# Patient Record
Sex: MALE | Race: WHITE | NOT HISPANIC OR LATINO | Employment: OTHER | URBAN - METROPOLITAN AREA
[De-identification: names, ages, dates, MRNs, and addresses within clinical notes are randomized per-mention and may not be internally consistent; named-entity substitution may affect disease eponyms.]

---

## 2017-02-02 ENCOUNTER — TELEPHONE (OUTPATIENT)
Dept: CARDIOLOGY | Facility: MEDICAL CENTER | Age: 67
End: 2017-02-02

## 2017-02-02 ENCOUNTER — OFFICE VISIT (OUTPATIENT)
Dept: CARDIOLOGY | Facility: MEDICAL CENTER | Age: 67
End: 2017-02-02
Payer: COMMERCIAL

## 2017-02-02 VITALS
HEIGHT: 68 IN | BODY MASS INDEX: 32.43 KG/M2 | DIASTOLIC BLOOD PRESSURE: 62 MMHG | SYSTOLIC BLOOD PRESSURE: 110 MMHG | HEART RATE: 78 BPM | WEIGHT: 214 LBS | OXYGEN SATURATION: 95 %

## 2017-02-02 DIAGNOSIS — R06.02 SOB (SHORTNESS OF BREATH): ICD-10-CM

## 2017-02-02 DIAGNOSIS — E83.42 HYPOMAGNESEMIA: ICD-10-CM

## 2017-02-02 DIAGNOSIS — E11.9 TYPE 2 DIABETES MELLITUS WITHOUT COMPLICATION, WITHOUT LONG-TERM CURRENT USE OF INSULIN (HCC): ICD-10-CM

## 2017-02-02 DIAGNOSIS — E11.8 TYPE 2 DIABETES MELLITUS WITH COMPLICATION, WITH LONG-TERM CURRENT USE OF INSULIN (HCC): ICD-10-CM

## 2017-02-02 DIAGNOSIS — I46.9 CARDIAC ARREST (HCC): ICD-10-CM

## 2017-02-02 DIAGNOSIS — E78.2 MIXED HYPERLIPIDEMIA: ICD-10-CM

## 2017-02-02 DIAGNOSIS — Z79.4 TYPE 2 DIABETES MELLITUS WITH COMPLICATION, WITH LONG-TERM CURRENT USE OF INSULIN (HCC): ICD-10-CM

## 2017-02-02 PROCEDURE — 99214 OFFICE O/P EST MOD 30 MIN: CPT | Performed by: INTERNAL MEDICINE

## 2017-02-02 ASSESSMENT — ENCOUNTER SYMPTOMS
RESPIRATORY NEGATIVE: 1
EYES NEGATIVE: 1
CARDIOVASCULAR NEGATIVE: 1
ORTHOPNEA: 0
SPUTUM PRODUCTION: 0
CLAUDICATION: 0
FEVER: 0
WEAKNESS: 0
MUSCULOSKELETAL NEGATIVE: 1
HEMOPTYSIS: 0
LOSS OF CONSCIOUSNESS: 0
WHEEZING: 0
SORE THROAT: 0
STRIDOR: 0
BRUISES/BLEEDS EASILY: 0
PND: 0
NEUROLOGICAL NEGATIVE: 1
DIZZINESS: 0
CONSTITUTIONAL NEGATIVE: 1
CHILLS: 0
SHORTNESS OF BREATH: 0
COUGH: 0
PALPITATIONS: 0
GASTROINTESTINAL NEGATIVE: 1

## 2017-02-02 NOTE — Clinical Note
[facility]   [address], [facility] [address] [facility] [address]  [place] [address]  Phone: [phone]  Fax: [phone]              Mark Lopez  1950    Encounter Date: 2/2/2017    Paul Baldwin M.D.          PROGRESS NOTE:  Subjective:   Mark Lopez is a 66 y.o. male who presents today for his lower extremity edema mild pulmonary protection. Right heart catheterization did not suggest pulmonary arterial hypertension. He's been responding to Lasix. He had a sleep study completed which showed 100 apnic events per hour. He is feeling significantly improved and less fatigue.    Past Medical History   Diagnosis Date   • Hyperlipidemia 4/23/2012   • Renal disorder      kidney stones   • Renal failure    • Heart failure (CMS-HCC)    • Hypertension    • CHF (congestive heart failure) (CMS-HCC) 8//12/16     diastolic   • Arthritis      all over   • Cancer (CMS-HCC) 2010     thyroid cancer   • DM (diabetes mellitus) (CMS-HCC) 4/23/2012     oral meds only   • High cholesterol    • Indigestion    • Dental disorder      upper denture   • Breath shortness      r/t chf   • Snoring    • Disorder of thyroid      had surgery, on meds   • Cataract      shandra eyes, no surgery yet   • CHEST PAIN 4/23/2012   • Heart burn    • Pain      compression fracture of L1     Past Surgical History   Procedure Laterality Date   • Other abdominal surgery       gallbladder   • Other       Thyroid   • Other       Ear tumor   • Exploratory laparotomy N/A 9/5/2015     Procedure: EXPLORATORY LAPAROTOMY;  Surgeon: Mark Peters M.D.;  Location: [facility];  Service:    • Exploratory laparotomy  9/7/2015     Procedure: EXPLORATORY LAPAROTOMY;  Surgeon: Mark Peters M.D.;  Location: [facility];  Service:    • Incision and drainage general  9/12/2015     Procedure: INCISION AND DRAINAGE GENERAL ABDOMINAL WASHOUT AND CLOSURE;  Surgeon: Mark Peters M.D.;   Location: SURGERY [facility];  Service:    • Other orthopedic surgery       right shoulder x 2   • Bunionectomy Left      Family History   Problem Relation Age of Onset   • Heart Attack Father    • Heart Disease Father    • Heart Disease Brother    • Heart Attack Brother      History   Smoking status   • Never Smoker    Smokeless tobacco   • Never Used     Allergies   Allergen Reactions   • Codeine Vomiting and Nausea     Outpatient Encounter Prescriptions as of 2/2/2017   Medication Sig Dispense Refill   • furosemide (LASIX) 20 MG Tab Take 1 Tab by mouth every morning. Take additional tablet as needed for weight gain of 2-3 lbs overnight or 5 lbs in a week. 60 Tab 11   • atorvastatin (LIPITOR) 80 MG tablet Take 0.5 Tabs by mouth every evening. 30 Tab 11   • glimepiride (AMARYL) 2 MG Tab Take 2 Tabs by mouth every morning. 60 Tab 3   • aspirin (ASA) 81 MG Chew Tab chewable tablet Take 1 Tab by mouth every day. 100 Tab 11   • omeprazole (PRILOSEC) 20 MG delayed-release capsule Take 40 mg by mouth every day. Indications: Gastroesophageal Reflux Disease     • pregabalin (LYRICA) 150 MG Cap Take 300 mg by mouth 2 times a day.     • Multiple Vitamins-Minerals (CENTRUM) Tab Take 1 Tab by mouth every day.     • levothyroxine (SYNTHROID) 175 MCG Tab Take 175 mcg by mouth Every morning on an empty stomach.     • tamsulosin (FLOMAX) 0.4 MG capsule Take 0.8 mg by mouth every evening.     • methylPREDNISolone (MEDROL) 4 MG Tab Take 1 Tab by mouth every day. (Patient not taking: Reported on 10/14/2016) 30 Tab 0     No facility-administered encounter medications on file as of 2/2/2017.     Review of Systems   Constitutional: Negative.  Negative for fever, chills and malaise/fatigue.   HENT: Negative.  Negative for sore throat.    Eyes: Negative.    Respiratory: Negative.  Negative for cough, hemoptysis, sputum production, shortness of breath, wheezing and stridor.    Cardiovascular: Negative.  Negative for chest pain,  "palpitations, orthopnea, claudication, leg swelling and PND.   Gastrointestinal: Negative.    Genitourinary: Negative.    Musculoskeletal: Negative.    Skin: Negative.    Neurological: Negative.  Negative for dizziness, loss of consciousness and weakness.   Endo/Heme/Allergies: Negative.  Does not bruise/bleed easily.   All other systems reviewed and are negative.       Objective:   /62 mmHg  Pulse 78  Ht 1.727 m (5' 8\")  Wt 97.07 kg (214 lb)  BMI 32.55 kg/m2  SpO2 95%    Physical Exam   Constitutional: He is oriented to person, place, and time. He appears well-developed and well-nourished. No distress.   HENT:   Head: Normocephalic.   Mouth/Throat: Oropharynx is clear and moist.   Eyes: EOM are normal. Pupils are equal, round, and reactive to light. Right eye exhibits no discharge. Left eye exhibits no discharge. No scleral icterus.   Neck: Normal range of motion. Neck supple. No JVD present. No tracheal deviation present.   Cardiovascular: Normal rate, regular rhythm, S1 normal, S2 normal, normal heart sounds, intact distal pulses and normal pulses.  Exam reveals no gallop, no S3, no S4 and no friction rub.    No murmur heard.   No systolic murmur is present    No diastolic murmur is present   Pulses:       Carotid pulses are 2+ on the right side, and 2+ on the left side.       Radial pulses are 2+ on the right side, and 2+ on the left side.        Dorsalis pedis pulses are 2+ on the right side, and 2+ on the left side.        Posterior tibial pulses are 2+ on the right side, and 2+ on the left side.   Pulmonary/Chest: Effort normal and breath sounds normal. No respiratory distress. He has no wheezes. He has no rales.   Abdominal: Soft. Bowel sounds are normal. He exhibits no distension and no mass. There is no tenderness. There is no rebound and no guarding.   Musculoskeletal: He exhibits no edema.   Neurological: He is alert and oriented to person, place, and time. No cranial nerve deficit.   Skin: " Skin is warm and dry. He is not diaphoretic. No pallor.   Psychiatric: He has a normal mood and affect. His behavior is normal. Judgment and thought content normal.   Nursing note and vitals reviewed.      Assessment:     1. Type 2 diabetes mellitus without complication, without long-term current use of insulin (HCC)     2. Cardiac arrest (CMS-HCC)     3. Type 2 diabetes mellitus with complication, with long-term current use of insulin (HCC)     4. SOB (shortness of breath)     5. Mixed hyperlipidemia     6. Hypomagnesemia         Medical Decision Making:  Today's Assessment / Status / Plan:     67 y/o M with mild LE edema and CARMELITA.  No CHF.  We will follow his response to CARMELITA therapy and likely discharge him from clinic once we get him euvolemic.    Thank for you allowing me to take part in your patient's care, please call should you have any questions or would like to discuss this patient.      Homero Sahu PA-C  [address] [place] [address]  VIA Facsimile: [phone]

## 2017-02-02 NOTE — TELEPHONE ENCOUNTER
Spoke with patient who states for the past week or so his BP has been low and pulse elevated.  1/2880/60, 1/29 85/64, 1/31 74/58, 2/1 99/70 with pulse ranging .  He is not taking his furosemide unless he has weight gain which he has not with weight stable at about 208 #.  He is feeling ok except at night with C-Pap gets some SOB with change of position.  He is able to shovel snow and does not get short of breath.  Scheduled him today with Dr. Baldwin @ 1:30pm.  Marie Mckinney in authorizations notified.

## 2017-02-02 NOTE — TELEPHONE ENCOUNTER
----- Message from Florence Feng sent at 2/2/2017  8:24 AM PST -----  Regarding: Problems with blood pressure and heart rate  MILES/Lily    Patient said that yesterday, 2/1, his blood pressure was 74/58 and his heart rate was 103. He wants a call back at [phone] to discuss and find out what he should do.

## 2017-02-02 NOTE — MR AVS SNAPSHOT
"        Mark Lopez   2017 1:30 PM   Office Visit   MRN: [id number]    Department:  [facility]   Dept Phone:  [phone]    Description:  Male : 1950   Provider:  Paul Baldwin M.D.           Reason for Visit     Follow-Up HF       Allergies as of 2017     Allergen Noted Reactions    Codeine 2015   Vomiting, Nausea      You were diagnosed with     Type 2 diabetes mellitus without complication, without long-term current use of insulin (CMS-HCC)   [5881717]       Cardiac arrest (CMS-HCC)   [427.5.ICD-9-CM]       Type 2 diabetes mellitus with complication, with long-term current use of insulin (CMS-HCC)   [0611317]       SOB (shortness of breath)   [469261]       Mixed hyperlipidemia   [272.2.ICD-9-CM]       Hypomagnesemia   [653305]         Vital Signs     Blood Pressure Pulse Height Weight Body Mass Index Oxygen Saturation    110/62 mmHg 78 1.727 m (5' 8\") 97.07 kg (214 lb) 32.55 kg/m2 95%    Smoking Status                   Never Smoker            Basic Information     Date Of Birth Sex Race Ethnicity Preferred Language    1950 Male White Non- English      Your appointments     Mar 02, 2017  9:15 AM   Heart Failure Established with Paul Baldwin M.D.   [facility] (--)    [address] [facility] [address]  [place] [facility] [facility] [address]   [phone]              Problem List              ICD-10-CM Priority Class Noted - Resolved    CHEST PAIN    2012 - Present    DM (diabetes mellitus) (CMS-HCC) E11.9   2012 - Present    DM2 (diabetes mellitus, type 2) (CMS-HCC) E11.9 Low  2015 - Present    Leukocytosis D72.829 Medium  2015 - Present    ARF (acute renal failure) (CMS-HCC) N17.9 High  2015 - Present    Intra-abdominal hematoma S36.92XA High  2015 - Present    Cardiac arrest (CMS-HCC) I46.9 Low  2015 - Present    Respiratory insufficiency following shock, trauma, or surgery MEG1884 Medium  2015 - " Present    Inadequate anticoagulation Z51.81, Z79.01 Medium  9/9/2015 - Present    BPH (benign prostatic hypertrophy) N40.0 Medium  9/15/2015 - Present    Hypomagnesemia E83.42 Medium  9/21/2015 - Present    Left-sided weakness M62.81   12/23/2015 - Present    Acute diastolic heart failure (CMS-HCC) I50.31   8/11/2016 - Present    Hypokalemia E87.6   8/11/2016 - Present    Essential hypertension I10   8/18/2016 - Present    Mixed hyperlipidemia E78.2   8/18/2016 - Present    SOB (shortness of breath) R06.02   9/8/2016 - Present      Health Maintenance        Date Due Completion Dates    DIABETES MONOFILAMENT / LE EXAM 1/12/1951 ---    RETINAL SCREENING 7/12/1969 ---    URINE ACR / MICROALBUMIN 7/12/1969 ---    IMM DTaP/Tdap/Td Vaccine (1 - Tdap) 7/12/1970 ---    COLONOSCOPY 7/12/2001 ---    IMM ZOSTER VACCINE 7/12/2011 ---    IMM PNEUMOCOCCAL 65+ (ADULT) LOW/MEDIUM RISK SERIES (1 of 2 - PCV13) 8/31/2016 8/31/2015    IMM INFLUENZA (1) 9/1/2016 9/17/2015    A1C SCREENING 3/6/2017 9/6/2016, 12/23/2015, 8/30/2015    FASTING LIPID PROFILE 9/6/2017 9/6/2016    SERUM CREATININE 9/16/2017 9/16/2016, 9/6/2016, 8/12/2016, 8/11/2016, 8/10/2016, 12/23/2015, 11/30/2015, 9/22/2015, 9/21/2015, 9/18/2015, 9/17/2015, 9/16/2015, 9/15/2015, 9/14/2015, 9/13/2015, 9/12/2015, 9/11/2015, 9/10/2015, 9/9/2015, 9/7/2015, 9/6/2015, 9/5/2015, 9/5/2015, 9/5/2015, 9/5/2015, 9/5/2015, 9/4/2015, 9/3/2015, 9/2/2015, 9/1/2015, 8/31/2015, 8/30/2015, 8/29/2015, 2/9/2005            Current Immunizations     Influenza Vaccine Quad Inj (Preserved) 9/17/2015  1:32 PM    Pneumococcal polysaccharide vaccine (PPSV-23) 8/31/2015  6:48 AM      Below and/or attached are the medications your provider expects you to take. Review all of your home medications and newly ordered medications with your provider and/or pharmacist. Follow medication instructions as directed by your provider and/or pharmacist. Please keep your medication list with you and share with your  provider. Update the information when medications are discontinued, doses are changed, or new medications (including over-the-counter products) are added; and carry medication information at all times in the event of emergency situations     Allergies:  CODEINE - Vomiting,Nausea               Medications  Valid as of: February 02, 2017 -  2:08 PM    Generic Name Brand Name Tablet Size Instructions for use    Aspirin (Chew Tab) ASA 81 MG Take 1 Tab by mouth every day.        Atorvastatin Calcium (Tab) LIPITOR 80 MG Take 0.5 Tabs by mouth every evening.        Furosemide (Tab) LASIX 20 MG Take 1 Tab by mouth every morning. Take additional tablet as needed for weight gain of 2-3 lbs overnight or 5 lbs in a week.        Glimepiride (Tab) AMARYL 2 MG Take 2 Tabs by mouth every morning.        Levothyroxine Sodium (Tab) SYNTHROID 175 MCG Take 175 mcg by mouth Every morning on an empty stomach.        MethylPREDNISolone (Tab) MEDROL 4 MG Take 1 Tab by mouth every day.        Multiple Vitamins-Minerals (Tab) CENTRUM  Take 1 Tab by mouth every day.        Omeprazole (CAPSULE DELAYED RELEASE) PRILOSEC 20 MG Take 40 mg by mouth every day. Indications: Gastroesophageal Reflux Disease        Pregabalin (Cap) LYRICA 150 MG Take 300 mg by mouth 2 times a day.        Tamsulosin HCl (Cap) FLOMAX 0.4 MG Take 0.8 mg by mouth every evening.        .                 Medicines prescribed today were sent to:     [facility] [address] [address]    Phone: [phone] Fax: [phone]    Open 24 Hours?: No      Medication refill instructions:       If your prescription bottle indicates you have medication refills left, it is not necessary to call your provider’s office. Please contact your pharmacy and they will refill your medication.    If your prescription bottle indicates you do not have any refills left, you may request refills at any time through one of the  following ways: The online kooldiner system (except Urgent Care), by calling your provider’s office, or by asking your pharmacy to contact your provider’s office with a refill request. Medication refills are processed only during regular business hours and may not be available until the next business day. Your provider may request additional information or to have a follow-up visit with you prior to refilling your medication.   *Please Note: Medication refills are assigned a new Rx number when refilled electronically. Your pharmacy may indicate that no refills were authorized even though a new prescription for the same medication is available at the pharmacy. Please request the medicine by name with the pharmacy before contacting your provider for a refill.           kooldiner Access Code: Activation code not generated  Current kooldiner Status: Active

## 2017-02-03 ENCOUNTER — TELEPHONE (OUTPATIENT)
Dept: CARDIOLOGY | Facility: MEDICAL CENTER | Age: 67
End: 2017-02-03

## 2017-02-03 RX ORDER — ACETAZOLAMIDE 125 MG/1
125 TABLET ORAL DAILY
COMMUNITY
Start: 2017-02-03 | End: 2022-07-15

## 2017-02-03 RX ORDER — CYANOCOBALAMIN 1000 UG/ML
1000 INJECTION, SOLUTION INTRAMUSCULAR; SUBCUTANEOUS
COMMUNITY
Start: 2017-02-03 | End: 2023-07-14

## 2017-02-03 RX ORDER — SPIRONOLACTONE 25 MG/1
25 TABLET ORAL DAILY
COMMUNITY
Start: 2017-02-03 | End: 2022-04-01

## 2017-02-03 RX ORDER — TESTOSTERONE CYPIONATE 1000 MG/10ML
75 INJECTION, SOLUTION INTRAMUSCULAR
COMMUNITY
Start: 2017-02-03 | End: 2022-11-04

## 2017-02-03 RX ORDER — PIOGLITAZONEHYDROCHLORIDE 30 MG/1
30 TABLET ORAL DAILY
COMMUNITY
Start: 2017-02-03 | End: 2017-02-15 | Stop reason: SDUPTHER

## 2017-02-03 RX ORDER — CABERGOLINE 0.5 MG/1
0.5 TABLET ORAL
COMMUNITY
Start: 2017-02-03 | End: 2019-06-12

## 2017-02-03 NOTE — TELEPHONE ENCOUNTER
----- Message from Jessica Luther sent at 2/2/2017  4:20 PM PST -----  Regarding: Pt calling back with update on his medication's   RO/Lily,    Patient calling back with information in regards to what medications he takes. States there was some confusion during his visit today, he can be reached at [phone] for a call back

## 2017-02-03 NOTE — PROGRESS NOTES
Reevaluation of 6MWT/MLWHF Questionnaire    9/8/2016:  6MWT: 296 meters  MLWHF: 7    2/2/2017:  6MWT: 298.7 meters  MLWHF: See TAMIA Whitaker RN  [phone]

## 2017-02-03 NOTE — TELEPHONE ENCOUNTER
Called patient and updated medication list.  He will make a new list and bring it with him when he comes to his next appointment.

## 2017-02-03 NOTE — PROGRESS NOTES
Subjective:   Mark Lopez is a 66 y.o. male who presents today for his lower extremity edema mild pulmonary protection. Right heart catheterization did not suggest pulmonary arterial hypertension. He's been responding to Lasix. He had a sleep study completed which showed 100 apnic events per hour. He is feeling significantly improved and less fatigue.    Past Medical History   Diagnosis Date   • Hyperlipidemia 4/23/2012   • Renal disorder      kidney stones   • Renal failure    • Heart failure (CMS-HCC)    • Hypertension    • CHF (congestive heart failure) (CMS-HCC) 8//12/16     diastolic   • Arthritis      all over   • Cancer (CMS-HCC) 2010     thyroid cancer   • DM (diabetes mellitus) (CMS-HCC) 4/23/2012     oral meds only   • High cholesterol    • Indigestion    • Dental disorder      upper denture   • Breath shortness      r/t chf   • Snoring    • Disorder of thyroid      had surgery, on meds   • Cataract      shandra eyes, no surgery yet   • CHEST PAIN 4/23/2012   • Heart burn    • Pain      compression fracture of L1     Past Surgical History   Procedure Laterality Date   • Other abdominal surgery       gallbladder   • Other       Thyroid   • Other       Ear tumor   • Exploratory laparotomy N/A 9/5/2015     Procedure: EXPLORATORY LAPAROTOMY;  Surgeon: Mark Peters M.D.;  Location: SURGERY [facility];  Service:    • Exploratory laparotomy  9/7/2015     Procedure: EXPLORATORY LAPAROTOMY;  Surgeon: Mark Peters M.D.;  Location: SURGERY [facility];  Service:    • Incision and drainage general  9/12/2015     Procedure: INCISION AND DRAINAGE GENERAL ABDOMINAL WASHOUT AND CLOSURE;  Surgeon: Mark Peters M.D.;  Location: SURGERY [facility];  Service:    • Other orthopedic surgery       right shoulder x 2   • Bunionectomy Left      Family History   Problem Relation Age of Onset   • Heart Attack Father    • Heart Disease Father    • Heart Disease Brother    • Heart Attack  Brother      History   Smoking status   • Never Smoker    Smokeless tobacco   • Never Used     Allergies   Allergen Reactions   • Codeine Vomiting and Nausea     Outpatient Encounter Prescriptions as of 2/2/2017   Medication Sig Dispense Refill   • furosemide (LASIX) 20 MG Tab Take 1 Tab by mouth every morning. Take additional tablet as needed for weight gain of 2-3 lbs overnight or 5 lbs in a week. 60 Tab 11   • atorvastatin (LIPITOR) 80 MG tablet Take 0.5 Tabs by mouth every evening. 30 Tab 11   • glimepiride (AMARYL) 2 MG Tab Take 2 Tabs by mouth every morning. 60 Tab 3   • aspirin (ASA) 81 MG Chew Tab chewable tablet Take 1 Tab by mouth every day. 100 Tab 11   • omeprazole (PRILOSEC) 20 MG delayed-release capsule Take 40 mg by mouth every day. Indications: Gastroesophageal Reflux Disease     • pregabalin (LYRICA) 150 MG Cap Take 300 mg by mouth 2 times a day.     • Multiple Vitamins-Minerals (CENTRUM) Tab Take 1 Tab by mouth every day.     • levothyroxine (SYNTHROID) 175 MCG Tab Take 175 mcg by mouth Every morning on an empty stomach.     • tamsulosin (FLOMAX) 0.4 MG capsule Take 0.8 mg by mouth every evening.     • methylPREDNISolone (MEDROL) 4 MG Tab Take 1 Tab by mouth every day. (Patient not taking: Reported on 10/14/2016) 30 Tab 0     No facility-administered encounter medications on file as of 2/2/2017.     Review of Systems   Constitutional: Negative.  Negative for fever, chills and malaise/fatigue.   HENT: Negative.  Negative for sore throat.    Eyes: Negative.    Respiratory: Negative.  Negative for cough, hemoptysis, sputum production, shortness of breath, wheezing and stridor.    Cardiovascular: Negative.  Negative for chest pain, palpitations, orthopnea, claudication, leg swelling and PND.   Gastrointestinal: Negative.    Genitourinary: Negative.    Musculoskeletal: Negative.    Skin: Negative.    Neurological: Negative.  Negative for dizziness, loss of consciousness and weakness.  "  Endo/Heme/Allergies: Negative.  Does not bruise/bleed easily.   All other systems reviewed and are negative.       Objective:   /62 mmHg  Pulse 78  Ht 1.727 m (5' 8\")  Wt 97.07 kg (214 lb)  BMI 32.55 kg/m2  SpO2 95%    Physical Exam   Constitutional: He is oriented to person, place, and time. He appears well-developed and well-nourished. No distress.   HENT:   Head: Normocephalic.   Mouth/Throat: Oropharynx is clear and moist.   Eyes: EOM are normal. Pupils are equal, round, and reactive to light. Right eye exhibits no discharge. Left eye exhibits no discharge. No scleral icterus.   Neck: Normal range of motion. Neck supple. No JVD present. No tracheal deviation present.   Cardiovascular: Normal rate, regular rhythm, S1 normal, S2 normal, normal heart sounds, intact distal pulses and normal pulses.  Exam reveals no gallop, no S3, no S4 and no friction rub.    No murmur heard.   No systolic murmur is present    No diastolic murmur is present   Pulses:       Carotid pulses are 2+ on the right side, and 2+ on the left side.       Radial pulses are 2+ on the right side, and 2+ on the left side.        Dorsalis pedis pulses are 2+ on the right side, and 2+ on the left side.        Posterior tibial pulses are 2+ on the right side, and 2+ on the left side.   Pulmonary/Chest: Effort normal and breath sounds normal. No respiratory distress. He has no wheezes. He has no rales.   Abdominal: Soft. Bowel sounds are normal. He exhibits no distension and no mass. There is no tenderness. There is no rebound and no guarding.   Musculoskeletal: He exhibits no edema.   Neurological: He is alert and oriented to person, place, and time. No cranial nerve deficit.   Skin: Skin is warm and dry. He is not diaphoretic. No pallor.   Psychiatric: He has a normal mood and affect. His behavior is normal. Judgment and thought content normal.   Nursing note and vitals reviewed.      Assessment:     1. Type 2 diabetes mellitus without " complication, without long-term current use of insulin (HCC)     2. Cardiac arrest (CMS-HCC)     3. Type 2 diabetes mellitus with complication, with long-term current use of insulin (HCC)     4. SOB (shortness of breath)     5. Mixed hyperlipidemia     6. Hypomagnesemia         Medical Decision Making:  Today's Assessment / Status / Plan:     67 y/o M with mild LE edema and CARMELITA.  No CHF.  We will follow his response to CARMELITA therapy and likely discharge him from clinic once we get him euvolemic.    Thank for you allowing me to take part in your patient's care, please call should you have any questions or would like to discuss this patient.

## 2017-02-07 ENCOUNTER — TELEPHONE (OUTPATIENT)
Dept: CARDIOLOGY | Facility: MEDICAL CENTER | Age: 67
End: 2017-02-07

## 2017-02-08 NOTE — TELEPHONE ENCOUNTER
----- Message from Kaitlin Lofton sent at 2/7/2017  4:10 PM PST -----  Regarding: med adjustments, low b/p & high heart rate  Contact: [phone]  MILES/doris  Pt calling and the adjustment RO was going to make due to low b/p and high heart rate.  Pt has been expecting a call back since last week, he needs immediate advice, said this cannot wait until next appt which is not until July 24th.  Please call Mark at [phone]

## 2017-02-15 ENCOUNTER — TELEPHONE (OUTPATIENT)
Dept: CARDIOLOGY | Facility: MEDICAL CENTER | Age: 67
End: 2017-02-15

## 2017-02-15 DIAGNOSIS — I50.30 CHF WITH LEFT VENTRICULAR DIASTOLIC DYSFUNCTION, NYHA CLASS 2 (HCC): ICD-10-CM

## 2017-02-15 RX ORDER — FUROSEMIDE 20 MG/1
20 TABLET ORAL PRN
COMMUNITY
Start: 2017-02-15 | End: 2019-06-12

## 2017-02-15 RX ORDER — PIOGLITAZONEHYDROCHLORIDE 30 MG/1
30 TABLET ORAL DAILY
COMMUNITY
Start: 2017-02-15

## 2017-02-15 NOTE — TELEPHONE ENCOUNTER
Pt notified and will take his BP machine to PCP office to have checked.  BP in office was 110/62 pulse 78.

## 2017-02-15 NOTE — TELEPHONE ENCOUNTER
Pt's medication list has been updated.  He is still having low BP and rapid HR he reports. BP ranging from 75/61 to 108/95, systolic mostly < 100.  HR 90 - 112.  He is not particularly symptomatic.  He is using his furosemide only with weight gain 2-3 #.    To Dr. Baldwin to advise regarding any medication changes as suggested to him at his appointment.

## 2017-04-18 ENCOUNTER — OFFICE VISIT (OUTPATIENT)
Dept: CARDIOLOGY | Facility: MEDICAL CENTER | Age: 67
End: 2017-04-18
Payer: COMMERCIAL

## 2017-04-18 VITALS
HEART RATE: 74 BPM | DIASTOLIC BLOOD PRESSURE: 62 MMHG | WEIGHT: 212 LBS | SYSTOLIC BLOOD PRESSURE: 110 MMHG | HEIGHT: 68 IN | BODY MASS INDEX: 32.13 KG/M2 | OXYGEN SATURATION: 97 %

## 2017-04-18 DIAGNOSIS — I46.9 CARDIAC ARREST (HCC): ICD-10-CM

## 2017-04-18 DIAGNOSIS — E78.2 MIXED HYPERLIPIDEMIA: ICD-10-CM

## 2017-04-18 DIAGNOSIS — I10 ESSENTIAL HYPERTENSION: ICD-10-CM

## 2017-04-18 DIAGNOSIS — I50.31 ACUTE DIASTOLIC HEART FAILURE (HCC): ICD-10-CM

## 2017-04-18 DIAGNOSIS — E11.8 TYPE 2 DIABETES MELLITUS WITH COMPLICATION, WITH LONG-TERM CURRENT USE OF INSULIN (HCC): ICD-10-CM

## 2017-04-18 DIAGNOSIS — R06.02 SOB (SHORTNESS OF BREATH): ICD-10-CM

## 2017-04-18 DIAGNOSIS — N17.1 ACUTE RENAL FAILURE WITH ACUTE CORTICAL NECROSIS (HCC): ICD-10-CM

## 2017-04-18 DIAGNOSIS — Z79.4 TYPE 2 DIABETES MELLITUS WITH COMPLICATION, WITH LONG-TERM CURRENT USE OF INSULIN (HCC): ICD-10-CM

## 2017-04-18 DIAGNOSIS — R53.1 LEFT-SIDED WEAKNESS: ICD-10-CM

## 2017-04-18 PROCEDURE — 99214 OFFICE O/P EST MOD 30 MIN: CPT | Performed by: INTERNAL MEDICINE

## 2017-04-18 RX ORDER — OMEPRAZOLE 40 MG/1
40 CAPSULE, DELAYED RELEASE ORAL DAILY
Refills: 0 | COMMUNITY
Start: 2017-04-16 | End: 2022-11-04

## 2017-04-18 RX ORDER — AMOXICILLIN 500 MG/1
CAPSULE ORAL
Refills: 0 | COMMUNITY
Start: 2017-01-27 | End: 2017-08-11

## 2017-04-18 RX ORDER — BLOOD-GLUCOSE METER
KIT MISCELLANEOUS
Refills: 0 | COMMUNITY
Start: 2017-01-19 | End: 2023-07-14

## 2017-04-18 ASSESSMENT — ENCOUNTER SYMPTOMS
FEVER: 0
SHORTNESS OF BREATH: 0
CARDIOVASCULAR NEGATIVE: 1
CONSTITUTIONAL NEGATIVE: 1
PALPITATIONS: 0
WEAKNESS: 0
STRIDOR: 0
SORE THROAT: 0
HEMOPTYSIS: 0
GASTROINTESTINAL NEGATIVE: 1
DIZZINESS: 0
COUGH: 0
ORTHOPNEA: 0
PND: 0
CLAUDICATION: 0
LOSS OF CONSCIOUSNESS: 0
SPUTUM PRODUCTION: 0
RESPIRATORY NEGATIVE: 1
CHILLS: 0
EYES NEGATIVE: 1
NEUROLOGICAL NEGATIVE: 1
BRUISES/BLEEDS EASILY: 0
WHEEZING: 0
MUSCULOSKELETAL NEGATIVE: 1

## 2017-04-18 NOTE — PROGRESS NOTES
Subjective:   Mark Lopez is a 66 y.o. male who presents today as follow-up for his lower extremity swelling edema and shortness of breath. Since starting CPAP and getting his blood pressure control is essentially feeling normal. He has good functional capacity. He recently retired in December and today his wife is retiring. There's been in the summer driving around [place] and spent some time with family. His blood pressures ranging about 100 systolic. His weights been stable about 207-210.    Past Medical History   Diagnosis Date   • Hyperlipidemia 4/23/2012   • Renal disorder      kidney stones   • Renal failure    • Heart failure (CMS-HCC)    • Hypertension    • CHF (congestive heart failure) (CMS-HCC) 8//12/16     diastolic   • Arthritis      all over   • Cancer (CMS-HCC) 2010     thyroid cancer   • DM (diabetes mellitus) (CMS-HCC) 4/23/2012     oral meds only   • High cholesterol    • Indigestion    • Dental disorder      upper denture   • Breath shortness      r/t chf   • Snoring    • Disorder of thyroid      had surgery, on meds   • Cataract      shandra eyes, no surgery yet   • CHEST PAIN 4/23/2012   • Heart burn    • Pain      compression fracture of L1     Past Surgical History   Procedure Laterality Date   • Other abdominal surgery       gallbladder   • Other       Thyroid   • Other       Ear tumor   • Exploratory laparotomy N/A 9/5/2015     Procedure: EXPLORATORY LAPAROTOMY;  Surgeon: Mark Peters M.D.;  Location: [facility];  Service:    • Exploratory laparotomy  9/7/2015     Procedure: EXPLORATORY LAPAROTOMY;  Surgeon: Mark Peters M.D.;  Location: [facility];  Service:    • Incision and drainage general  9/12/2015     Procedure: INCISION AND DRAINAGE GENERAL ABDOMINAL WASHOUT AND CLOSURE;  Surgeon: Mark Peters M.D.;  Location: [facility];  Service:    • Other orthopedic surgery       right shoulder x 2   • Bunionectomy Left       Family History   Problem Relation Age of Onset   • Heart Attack Father    • Heart Disease Father    • Heart Disease Brother    • Heart Attack Brother      History   Smoking status   • Never Smoker    Smokeless tobacco   • Never Used     Allergies   Allergen Reactions   • Codeine Vomiting and Nausea     Outpatient Encounter Prescriptions as of 4/18/2017   Medication Sig Dispense Refill   • FREESTYLE LITE strip   0   • omeprazole (PRILOSEC) 40 MG delayed-release capsule   0   • pioglitazone (ACTOS) 30 MG Tab Take 1 Tab by mouth every day.     • furosemide (LASIX) 20 MG Tab Take 1 Tab by mouth as needed. Take additional tablet as needed for weight gain of 2-3 lbs overnight or 5 lbs in a week.     • acetaZOLAMIDE (DIAMOX) 125 MG Tab Take 1 Tab by mouth 3 times a day.     • spironolactone (ALDACTONE) 25 MG Tab Take 1 Tab by mouth every day.     • cabergoline (DOSTINEX) 0.5 MG tablet Take 1 Tab by mouth 2X A WEEK.     • Testosterone Cypionate 100 MG/ML Solution by Intramuscular route.     • cyanocobalamin (VITAMIN B-12) 1000 MCG/ML Solution 1 mL by Intramuscular route every 7 days.     • aspirin (ASA) 81 MG Chew Tab chewable tablet Take 1 Tab by mouth every day. 100 Tab 11   • pregabalin (LYRICA) 150 MG Cap Take 300 mg by mouth 2 times a day.     • levothyroxine (SYNTHROID) 175 MCG Tab Take 175 mcg by mouth Every morning on an empty stomach.     • tamsulosin (FLOMAX) 0.4 MG capsule Take 0.8 mg by mouth every evening.     • amoxicillin (AMOXIL) 500 MG Cap take 1 capsule by mouth three times a day until finished  0   • STOOL SOFTENER 250 MG capsule   1   • atorvastatin (LIPITOR) 80 MG tablet Take 0.5 Tabs by mouth every evening. 30 Tab 11   • glimepiride (AMARYL) 2 MG Tab Take 2 Tabs by mouth every morning. 60 Tab 3   • omeprazole (PRILOSEC) 20 MG delayed-release capsule Take 40 mg by mouth every day. Indications: Gastroesophageal Reflux Disease       No facility-administered encounter medications on file as of  "4/18/2017.     Review of Systems   Constitutional: Negative.  Negative for fever, chills and malaise/fatigue.   HENT: Negative.  Negative for sore throat.    Eyes: Negative.    Respiratory: Negative.  Negative for cough, hemoptysis, sputum production, shortness of breath, wheezing and stridor.    Cardiovascular: Negative.  Negative for chest pain, palpitations, orthopnea, claudication, leg swelling and PND.   Gastrointestinal: Negative.    Genitourinary: Negative.    Musculoskeletal: Negative.    Skin: Negative.    Neurological: Negative.  Negative for dizziness, loss of consciousness and weakness.   Endo/Heme/Allergies: Negative.  Does not bruise/bleed easily.   All other systems reviewed and are negative.       Objective:   /62 mmHg  Pulse 74  Ht 1.727 m (5' 8\")  Wt 96.163 kg (212 lb)  BMI 32.24 kg/m2  SpO2 97%    Physical Exam   Constitutional: He is oriented to person, place, and time. He appears well-developed and well-nourished. No distress.   HENT:   Head: Normocephalic.   Mouth/Throat: Oropharynx is clear and moist.   Eyes: EOM are normal. Pupils are equal, round, and reactive to light. Right eye exhibits no discharge. Left eye exhibits no discharge. No scleral icterus.   Neck: Normal range of motion. Neck supple. No JVD present. No tracheal deviation present.   Cardiovascular: Normal rate, regular rhythm, S1 normal, S2 normal, normal heart sounds, intact distal pulses and normal pulses.  Exam reveals no gallop, no S3, no S4 and no friction rub.    No murmur heard.   No systolic murmur is present    No diastolic murmur is present   Pulses:       Carotid pulses are 2+ on the right side, and 2+ on the left side.       Radial pulses are 2+ on the right side, and 2+ on the left side.        Dorsalis pedis pulses are 2+ on the right side, and 2+ on the left side.        Posterior tibial pulses are 2+ on the right side, and 2+ on the left side.   Pulmonary/Chest: Effort normal and breath sounds normal. " No respiratory distress. He has no wheezes. He has no rales.   Abdominal: Soft. Bowel sounds are normal. He exhibits no distension and no mass. There is no tenderness. There is no rebound and no guarding.   Musculoskeletal: He exhibits no edema.   Neurological: He is alert and oriented to person, place, and time. No cranial nerve deficit.   Skin: Skin is warm and dry. He is not diaphoretic. No pallor.   Psychiatric: He has a normal mood and affect. His behavior is normal. Judgment and thought content normal.   Nursing note and vitals reviewed.      Assessment:     1. Acute diastolic heart failure (CMS-HCC)     2. Acute renal failure with acute cortical necrosis (CMS-HCC)     3. Cardiac arrest (CMS-HCC)     4. Type 2 diabetes mellitus with complication, with long-term current use of insulin ([facility])     5. Essential hypertension     6. Left-sided weakness     7. Mixed hyperlipidemia     8. SOB (shortness of breath)         Medical Decision Making:  Today's Assessment / Status / Plan:     66-year-old male with exertional shortness of breath and normal coronary arteries with mildly elevated PA pressure now on CPAP. His risk factors are well controlled. I will not make any changes to his medications today as he is doing extraordinarily well. I will simply see him back in 6 months.    1. SOB    - cont spironolactone 25    - lasix prn    - diamox 125 mg daily    2. T2DM, no obstructive CAD during cath    - cont atorva 80      Thank for you allowing me to take part in your patient's care, please call should you have any questions or would like to discuss this patient.

## 2017-04-18 NOTE — Clinical Note
[facility]   [address] [place] [address] [address]  [place] [address]  Phone: [phone]  Fax: [phone]              Mark Lopez  1950    Encounter Date: 4/18/2017    Paul Baldwin M.D.          PROGRESS NOTE:  Subjective:   Mark Lopez is a 66 y.o. male who presents today as follow-up for his lower extremity swelling edema and shortness of breath. Since starting CPAP and getting his blood pressure control is essentially feeling normal. He has good functional capacity. He recently retired in December and today his wife is retiring. There's been in the [place] and spent some time with family. His blood pressures ranging about 100 systolic. His weights been stable about 207-210.    Past Medical History   Diagnosis Date   • Hyperlipidemia 4/23/2012   • Renal disorder      kidney stones   • Renal failure    • Heart failure (CMS-HCC)    • Hypertension    • CHF (congestive heart failure) (CMS-HCC) 8//12/16     diastolic   • Arthritis      all over   • Cancer (CMS-HCC) 2010     thyroid cancer   • DM (diabetes mellitus) (CMS-HCC) 4/23/2012     oral meds only   • High cholesterol    • Indigestion    • Dental disorder      upper denture   • Breath shortness      r/t chf   • Snoring    • Disorder of thyroid      had surgery, on meds   • Cataract      shandra eyes, no surgery yet   • CHEST PAIN 4/23/2012   • Heart burn    • Pain      compression fracture of L1     Past Surgical History   Procedure Laterality Date   • Other abdominal surgery       gallbladder   • Other       Thyroid   • Other       Ear tumor   • Exploratory laparotomy N/A 9/5/2015     Procedure: EXPLORATORY LAPAROTOMY;  Surgeon: Mark Peters M.D.;  Location: [facility];  Service:    • Exploratory laparotomy  9/7/2015     Procedure: EXPLORATORY LAPAROTOMY;  Surgeon: Mark Peters M.D.;  Location: SURGERY [facility];  Service:    • Incision  and drainage general  9/12/2015     Procedure: INCISION AND DRAINAGE GENERAL ABDOMINAL WASHOUT AND CLOSURE;  Surgeon: Mark Peters M.D.;  Location: SURGERY [facility];  Service:    • Other orthopedic surgery       right shoulder x 2   • Bunionectomy Left      Family History   Problem Relation Age of Onset   • Heart Attack Father    • Heart Disease Father    • Heart Disease Brother    • Heart Attack Brother      History   Smoking status   • Never Smoker    Smokeless tobacco   • Never Used     Allergies   Allergen Reactions   • Codeine Vomiting and Nausea     Outpatient Encounter Prescriptions as of 4/18/2017   Medication Sig Dispense Refill   • FREESTYLE LITE strip   0   • omeprazole (PRILOSEC) 40 MG delayed-release capsule   0   • pioglitazone (ACTOS) 30 MG Tab Take 1 Tab by mouth every day.     • furosemide (LASIX) 20 MG Tab Take 1 Tab by mouth as needed. Take additional tablet as needed for weight gain of 2-3 lbs overnight or 5 lbs in a week.     • acetaZOLAMIDE (DIAMOX) 125 MG Tab Take 1 Tab by mouth 3 times a day.     • spironolactone (ALDACTONE) 25 MG Tab Take 1 Tab by mouth every day.     • cabergoline (DOSTINEX) 0.5 MG tablet Take 1 Tab by mouth 2X A WEEK.     • Testosterone Cypionate 100 MG/ML Solution by Intramuscular route.     • cyanocobalamin (VITAMIN B-12) 1000 MCG/ML Solution 1 mL by Intramuscular route every 7 days.     • aspirin (ASA) 81 MG Chew Tab chewable tablet Take 1 Tab by mouth every day. 100 Tab 11   • pregabalin (LYRICA) 150 MG Cap Take 300 mg by mouth 2 times a day.     • levothyroxine (SYNTHROID) 175 MCG Tab Take 175 mcg by mouth Every morning on an empty stomach.     • tamsulosin (FLOMAX) 0.4 MG capsule Take 0.8 mg by mouth every evening.     • amoxicillin (AMOXIL) 500 MG Cap take 1 capsule by mouth three times a day until finished  0   • STOOL SOFTENER 250 MG capsule   1   • atorvastatin (LIPITOR) 80 MG tablet Take 0.5 Tabs by mouth every evening. 30 Tab 11   • glimepiride  "(AMARYL) 2 MG Tab Take 2 Tabs by mouth every morning. 60 Tab 3   • omeprazole (PRILOSEC) 20 MG delayed-release capsule Take 40 mg by mouth every day. Indications: Gastroesophageal Reflux Disease       No facility-administered encounter medications on file as of 4/18/2017.     Review of Systems   Constitutional: Negative.  Negative for fever, chills and malaise/fatigue.   HENT: Negative.  Negative for sore throat.    Eyes: Negative.    Respiratory: Negative.  Negative for cough, hemoptysis, sputum production, shortness of breath, wheezing and stridor.    Cardiovascular: Negative.  Negative for chest pain, palpitations, orthopnea, claudication, leg swelling and PND.   Gastrointestinal: Negative.    Genitourinary: Negative.    Musculoskeletal: Negative.    Skin: Negative.    Neurological: Negative.  Negative for dizziness, loss of consciousness and weakness.   Endo/Heme/Allergies: Negative.  Does not bruise/bleed easily.   All other systems reviewed and are negative.       Objective:   /62 mmHg  Pulse 74  Ht 1.727 m (5' 8\")  Wt 96.163 kg (212 lb)  BMI 32.24 kg/m2  SpO2 97%    Physical Exam   Constitutional: He is oriented to person, place, and time. He appears well-developed and well-nourished. No distress.   HENT:   Head: Normocephalic.   Mouth/Throat: Oropharynx is clear and moist.   Eyes: EOM are normal. Pupils are equal, round, and reactive to light. Right eye exhibits no discharge. Left eye exhibits no discharge. No scleral icterus.   Neck: Normal range of motion. Neck supple. No JVD present. No tracheal deviation present.   Cardiovascular: Normal rate, regular rhythm, S1 normal, S2 normal, normal heart sounds, intact distal pulses and normal pulses.  Exam reveals no gallop, no S3, no S4 and no friction rub.    No murmur heard.   No systolic murmur is present    No diastolic murmur is present   Pulses:       Carotid pulses are 2+ on the right side, and 2+ on the left side.       Radial pulses are 2+ on " the right side, and 2+ on the left side.        Dorsalis pedis pulses are 2+ on the right side, and 2+ on the left side.        Posterior tibial pulses are 2+ on the right side, and 2+ on the left side.   Pulmonary/Chest: Effort normal and breath sounds normal. No respiratory distress. He has no wheezes. He has no rales.   Abdominal: Soft. Bowel sounds are normal. He exhibits no distension and no mass. There is no tenderness. There is no rebound and no guarding.   Musculoskeletal: He exhibits no edema.   Neurological: He is alert and oriented to person, place, and time. No cranial nerve deficit.   Skin: Skin is warm and dry. He is not diaphoretic. No pallor.   Psychiatric: He has a normal mood and affect. His behavior is normal. Judgment and thought content normal.   Nursing note and vitals reviewed.      Assessment:     1. Acute diastolic heart failure (CMS-HCC)     2. Acute renal failure with acute cortical necrosis (CMS-HCC)     3. Cardiac arrest (CMS-HCC)     4. Type 2 diabetes mellitus with complication, with long-term current use of insulin (HCC)     5. Essential hypertension     6. Left-sided weakness     7. Mixed hyperlipidemia     8. SOB (shortness of breath)         Medical Decision Making:  Today's Assessment / Status / Plan:     66-year-old male with exertional shortness of breath and normal coronary arteries with mildly elevated PA pressure now on CPAP. His risk factors are well controlled. I will not make any changes to his medications today as he is doing extraordinarily well. I will simply see him back in 6 months.    1. SOB    - cont spironolactone 25    - lasix prn    - diamox 125 mg daily    2. T2DM, no obstructive CAD during cath    - cont atorva 80      Thank for you allowing me to take part in your patient's care, please call should you have any questions or would like to discuss this patient.      Homero Sahu PA-C  [facility] [place] [address]  VIA Facsimile: [phone]

## 2017-04-18 NOTE — MR AVS SNAPSHOT
"        Mrak Lopez   2017 8:30 AM   Office Visit   MRN: [id number]    Department:  [facility]   Dept Phone:  [phone]    Description:  Male : 1950   Provider:  Paul Baldwin M.D.           Reason for Visit     Follow-Up HF established      Allergies as of 2017     Allergen Noted Reactions    Codeine 2015   Vomiting, Nausea      You were diagnosed with     Acute diastolic heart failure (CMS-HCC)   [428.31.ICD-9-CM]       Acute renal failure with acute cortical necrosis (CMS-[facility])   [629189]       Cardiac arrest (CMS-[facility])   [427.5.ICD-9-CM]       Type 2 diabetes mellitus with complication, with long-term current use of insulin (CMS-[facility])   [7787239]       Essential hypertension   [5177965]       Left-sided weakness   [722911]       Mixed hyperlipidemia   [272.2.ICD-9-CM]       SOB (shortness of breath)   [864193]         Vital Signs     Blood Pressure Pulse Height Weight Body Mass Index Oxygen Saturation    110/62 mmHg 74 1.727 m (5' 8\") 96.163 kg (212 lb) 32.24 kg/m2 97%    Smoking Status                   Never Smoker            Basic Information     Date Of Birth Sex Race Ethnicity Preferred Language    1950 Male White Non- English      Problem List              ICD-10-CM Priority Class Noted - Resolved    CHEST PAIN    2012 - Present    DM (diabetes mellitus) (CMS-[facility]) E11.9   2012 - Present    DM2 (diabetes mellitus, type 2) (CMS-HCC) E11.9 Low  2015 - Present    Leukocytosis D72.829 Medium  2015 - Present    ARF (acute renal failure) (CMS-[facility]) N17.9 High  2015 - Present    Intra-abdominal hematoma S36.92XA High  2015 - Present    Cardiac arrest (CMS-HCC) I46.9 Low  2015 - Present    Respiratory insufficiency following shock, trauma, or surgery [id number] Medium  2015 - Present    Inadequate anticoagulation Z51.81, Z79.01 Medium  2015 - Present    BPH (benign prostatic hypertrophy) N40.0 Medium  9/15/2015 - Present   " Hypomagnesemia E83.42 Medium  9/21/2015 - Present    Left-sided weakness M62.81   12/23/2015 - Present    Acute diastolic heart failure (CMS-HCC) I50.31   8/11/2016 - Present    Hypokalemia E87.6   8/11/2016 - Present    Essential hypertension I10   8/18/2016 - Present    Mixed hyperlipidemia E78.2   8/18/2016 - Present    SOB (shortness of breath) R06.02   9/8/2016 - Present      Health Maintenance        Date Due Completion Dates    DIABETES MONOFILAMENT / LE EXAM 1/12/1951 ---    RETINAL SCREENING 7/12/1969 ---    URINE ACR / MICROALBUMIN 7/12/1969 ---    IMM DTaP/Tdap/Td Vaccine (1 - Tdap) 7/12/1970 ---    COLONOSCOPY 7/12/2001 ---    IMM ZOSTER VACCINE 7/12/2011 ---    IMM PNEUMOCOCCAL 65+ (ADULT) LOW/MEDIUM RISK SERIES (1 of 2 - PCV13) 8/31/2016 8/31/2015    A1C SCREENING 3/6/2017 9/6/2016, 12/23/2015, 8/30/2015    FASTING LIPID PROFILE 9/6/2017 9/6/2016    SERUM CREATININE 9/16/2017 9/16/2016, 9/6/2016, 8/12/2016, 8/11/2016, 8/10/2016, 12/23/2015, 11/30/2015, 9/22/2015, 9/21/2015, 9/18/2015, 9/17/2015, 9/16/2015, 9/15/2015, 9/14/2015, 9/13/2015, 9/12/2015, 9/11/2015, 9/10/2015, 9/9/2015, 9/7/2015, 9/6/2015, 9/5/2015, 9/5/2015, 9/5/2015, 9/5/2015, 9/5/2015, 9/4/2015, 9/3/2015, 9/2/2015, 9/1/2015, 8/31/2015, 8/30/2015, 8/29/2015, 2/9/2005            Current Immunizations     Influenza Vaccine Quad Inj (Preserved) 9/17/2015  1:32 PM    Pneumococcal polysaccharide vaccine (PPSV-23) 8/31/2015  6:48 AM      Below and/or attached are the medications your provider expects you to take. Review all of your home medications and newly ordered medications with your provider and/or pharmacist. Follow medication instructions as directed by your provider and/or pharmacist. Please keep your medication list with you and share with your provider. Update the information when medications are discontinued, doses are changed, or new medications (including over-the-counter products) are added; and carry medication information at all  times in the event of emergency situations     Allergies:  CODEINE - Vomiting,Nausea               Medications  Valid as of: April 18, 2017 -  8:41 AM    Generic Name Brand Name Tablet Size Instructions for use    AcetaZOLAMIDE (Tab) DIAMOX 125 MG Take 1 Tab by mouth 3 times a day.        Amoxicillin (Cap) AMOXIL 500 MG take 1 capsule by mouth three times a day until finished        Aspirin (Chew Tab) ASA 81 MG Take 1 Tab by mouth every day.        Atorvastatin Calcium (Tab) LIPITOR 80 MG Take 0.5 Tabs by mouth every evening.        Cabergoline (Tab) DOSTINEX 0.5 MG Take 1 Tab by mouth 2X A WEEK.        Cyanocobalamin (Solution) VITAMIN B-12 1000 MCG/ML 1 mL by Intramuscular route every 7 days.        Docusate Sodium (Cap) STOOL SOFTENER 250 MG         Furosemide (Tab) LASIX 20 MG Take 1 Tab by mouth as needed. Take additional tablet as needed for weight gain of 2-3 lbs overnight or 5 lbs in a week.        Glimepiride (Tab) AMARYL 2 MG Take 2 Tabs by mouth every morning.        Glucose Blood (Strip) FREESTYLE LITE          Levothyroxine Sodium (Tab) SYNTHROID 175 MCG Take 175 mcg by mouth Every morning on an empty stomach.        Omeprazole (CAPSULE DELAYED RELEASE) PRILOSEC 20 MG Take 40 mg by mouth every day. Indications: Gastroesophageal Reflux Disease        Omeprazole (CAPSULE DELAYED RELEASE) PRILOSEC 40 MG         Pioglitazone HCl (Tab) ACTOS 30 MG Take 1 Tab by mouth every day.        Pregabalin (Cap) LYRICA 150 MG Take 300 mg by mouth 2 times a day.        Spironolactone (Tab) ALDACTONE 25 MG Take 1 Tab by mouth every day.        Tamsulosin HCl (Cap) FLOMAX 0.4 MG Take 0.8 mg by mouth every evening.        Testosterone Cypionate (Solution) Testosterone Cypionate 100 MG/ML by Intramuscular route.        .                 Medicines prescribed today were sent to:     [facility] [address] [address]    Phone: [phone] Fax: [phone]       Open 24 Hours?: No      Medication refill instructions:       If your prescription bottle indicates you have medication refills left, it is not necessary to call your provider’s office. Please contact your pharmacy and they will refill your medication.    If your prescription bottle indicates you do not have any refills left, you may request refills at any time through one of the following ways: The online [employer] system (except Urgent Care), by calling your provider’s office, or by asking your pharmacy to contact your provider’s office with a refill request. Medication refills are processed only during regular business hours and may not be available until the next business day. Your provider may request additional information or to have a follow-up visit with you prior to refilling your medication.   *Please Note: Medication refills are assigned a new Rx number when refilled electronically. Your pharmacy may indicate that no refills were authorized even though a new prescription for the same medication is available at the pharmacy. Please request the medicine by name with the pharmacy before contacting your provider for a refill.           [employer] Access Code: Activation code not generated  Current [employer] Status: Active

## 2017-08-11 ENCOUNTER — APPOINTMENT (OUTPATIENT)
Dept: ADMISSIONS | Facility: MEDICAL CENTER | Age: 67
End: 2017-08-11
Attending: OPHTHALMOLOGY
Payer: COMMERCIAL

## 2017-08-15 ENCOUNTER — HOSPITAL ENCOUNTER (OUTPATIENT)
Facility: MEDICAL CENTER | Age: 67
End: 2017-08-15
Attending: OPHTHALMOLOGY | Admitting: OPHTHALMOLOGY
Payer: COMMERCIAL

## 2017-08-15 VITALS
DIASTOLIC BLOOD PRESSURE: 78 MMHG | RESPIRATION RATE: 16 BRPM | HEIGHT: 68 IN | WEIGHT: 208.34 LBS | TEMPERATURE: 97.5 F | BODY MASS INDEX: 31.57 KG/M2 | HEART RATE: 60 BPM | SYSTOLIC BLOOD PRESSURE: 120 MMHG | OXYGEN SATURATION: 96 %

## 2017-08-15 PROBLEM — H26.9 CATARACT: Status: ACTIVE | Noted: 2017-08-15

## 2017-08-15 LAB
EKG IMPRESSION: NORMAL
GLUCOSE BLD-MCNC: 148 MG/DL (ref 65–99)

## 2017-08-15 PROCEDURE — 700101 HCHG RX REV CODE 250

## 2017-08-15 PROCEDURE — 501749 HCHG SHELL REV 276: Performed by: OPHTHALMOLOGY

## 2017-08-15 PROCEDURE — 160048 HCHG OR STATISTICAL LEVEL 1-5: Performed by: OPHTHALMOLOGY

## 2017-08-15 PROCEDURE — 99152 MOD SED SAME PHYS/QHP 5/>YRS: CPT | Performed by: OPHTHALMOLOGY

## 2017-08-15 PROCEDURE — 93010 ELECTROCARDIOGRAM REPORT: CPT | Performed by: INTERNAL MEDICINE

## 2017-08-15 PROCEDURE — 93005 ELECTROCARDIOGRAM TRACING: CPT | Performed by: OPHTHALMOLOGY

## 2017-08-15 PROCEDURE — 500855 HCHG NEEDLE, IRRIG CYSTOTOME 27G: Performed by: OPHTHALMOLOGY

## 2017-08-15 PROCEDURE — 700111 HCHG RX REV CODE 636 W/ 250 OVERRIDE (IP)

## 2017-08-15 PROCEDURE — 500791 HCHG KNIFE, SLIT: Performed by: OPHTHALMOLOGY

## 2017-08-15 PROCEDURE — 160035 HCHG PACU - 1ST 60 MINS PHASE I: Performed by: OPHTHALMOLOGY

## 2017-08-15 PROCEDURE — 82962 GLUCOSE BLOOD TEST: CPT

## 2017-08-15 PROCEDURE — 160002 HCHG RECOVERY MINUTES (STAT): Performed by: OPHTHALMOLOGY

## 2017-08-15 PROCEDURE — 160029 HCHG SURGERY MINUTES - 1ST 30 MINS LEVEL 4: Performed by: OPHTHALMOLOGY

## 2017-08-15 RX ORDER — KETOROLAC TROMETHAMINE 5 MG/ML
SOLUTION OPHTHALMIC
Status: COMPLETED
Start: 2017-08-15 | End: 2017-08-15

## 2017-08-15 RX ORDER — SODIUM CHLORIDE, SODIUM LACTATE, POTASSIUM CHLORIDE, CALCIUM CHLORIDE 600; 310; 30; 20 MG/100ML; MG/100ML; MG/100ML; MG/100ML
INJECTION, SOLUTION INTRAVENOUS CONTINUOUS
Status: DISCONTINUED | OUTPATIENT
Start: 2017-08-15 | End: 2017-08-15 | Stop reason: HOSPADM

## 2017-08-15 RX ORDER — TETRACAINE HYDROCHLORIDE 5 MG/ML
SOLUTION OPHTHALMIC
Status: COMPLETED
Start: 2017-08-15 | End: 2017-08-15

## 2017-08-15 RX ORDER — TROPICAMIDE 10 MG/ML
SOLUTION/ DROPS OPHTHALMIC
Status: COMPLETED
Start: 2017-08-15 | End: 2017-08-15

## 2017-08-15 RX ORDER — PHENYLEPHRINE HYDROCHLORIDE 25 MG/ML
SOLUTION/ DROPS OPHTHALMIC
Status: COMPLETED
Start: 2017-08-15 | End: 2017-08-15

## 2017-08-15 RX ORDER — MOXIFLOXACIN 5 MG/ML
SOLUTION/ DROPS OPHTHALMIC
Status: COMPLETED
Start: 2017-08-15 | End: 2017-08-15

## 2017-08-15 RX ORDER — TETRACAINE HYDROCHLORIDE 5 MG/ML
SOLUTION OPHTHALMIC
Status: DISCONTINUED
Start: 2017-08-15 | End: 2017-08-15 | Stop reason: HOSPADM

## 2017-08-15 RX ADMIN — MOXIFLOXACIN HYDROCHLORIDE 1 DROP: 5 SOLUTION/ DROPS OPHTHALMIC at 08:45

## 2017-08-15 RX ADMIN — KETOROLAC TROMETHAMINE 1 DROP: 5 SOLUTION OPHTHALMIC at 08:45

## 2017-08-15 RX ADMIN — PHENYLEPHRINE HYDROCHLORIDE 1 DROP: 2.5 SOLUTION/ DROPS OPHTHALMIC at 08:45

## 2017-08-15 RX ADMIN — TROPICAMIDE 1 DROP: 10 SOLUTION/ DROPS OPHTHALMIC at 08:45

## 2017-08-15 RX ADMIN — TETRACAINE HYDROCHLORIDE 1 DROP: 5 SOLUTION OPHTHALMIC at 08:45

## 2017-08-15 ASSESSMENT — PAIN SCALES - GENERAL
PAINLEVEL_OUTOF10: 0

## 2017-08-15 NOTE — IP AVS SNAPSHOT
8/15/2017    Mark Mcginnisvins  [phone]  [place] [address]    Dear Mark:    [facility] wants to ensure your discharge home is safe and you or your loved ones have had all of your questions answered regarding your care after you leave the hospital.    Below is a list of resources and contact information should you have any questions regarding your hospital stay, follow-up instructions, or active medical symptoms.    Questions or Concerns Regarding… Contact   Medical Questions Related to Your Discharge  (7 days a week, 8am-5pm) Contact a Nurse Care Coordinator   [phone]   Medical Questions Not Related to Your Discharge  (24 hours a day / 7 days a week)  Contact the Nurse Health Line   [phone]    Medications or Discharge Instructions Refer to your discharge packet   or contact your [facility] Primary Care Provider   [phone]   Follow-up Appointment(s) Schedule your appointment via [employer]   or contact Scheduling [phone]   Billing Review your statement via [employer]ient  or contact Billing [phone]   Medical Records Review your records via S[employer]ient   or contact Medical Records [phone]     You may receive a telephone call within two days of discharge. This call is to make certain you understand your discharge instructions and have the opportunity to have any questions answered. You can also easily access your medical information, test results and upcoming appointments via the [employer]ient free online health management tool. You can learn more and sign up at [email or web address]. For assistance setting up your Sapient account, please call [phone].    Once again, we want to ensure your discharge home is safe and that you have a clear understanding of any next steps in your care. If you have any questions or concerns, please do not hesitate to contact us, we are here for you. Thank you for choosing [facility] for your healthcare needs.    Sincerely,    Your [facility] Healthcare Team

## 2017-08-15 NOTE — IP AVS SNAPSHOT
" Home Care Instructions                                                                                                                Name:Mark Lopez  Medical Record Number:[id number]  CSN: [id number]    YOB: 1950   Age: 67 y.o.  Sex: male  HT:1.727 m (5' 8\") WT: 94.5 kg (208 lb 5.4 oz)          Admit Date: 8/15/2017     Discharge Date:   Today's Date: 8/15/2017  Attending Doctor:  Osmel Villarreal M.D.                  Allergies:  Codeine                Discharge Instructions       HOME CARE INSTRUCTIONS FOR CATARACT SURGERY    ACTIVITY: Rest and take it easy for the first 24 hours. We strongly suggest that a responsible adult remain with you during that time. It is normal to feel sleepy. We encourage you to not do anything that requires balance, judgment or coordination. Be extra careful when walking (with a dilated eye, it is easier to trip and fall).     FOR 24 HOURS, DO NOT:       Drive, operate machinery or run household appliances.        Drink beer or alcoholic beverages.        Make important decisions or sign legal documents.     DIET: To avoid nausea, slowly advance diet as tolerated, avoiding spicy or greasy foods for the first meal.     MEDICATIONS: Resume taking daily medication. You may take Tylenol for mild discomfort, if needed.     SURGICAL DRESSING: Eye shield as instructed by your doctor. Dark glasses should be worn while in the sunlight.     Follow your Physician's instruction Sheet. Eye Kit Given    A follow-up appointment is scheduled with your doctor today at _2:30pm.     You should call 911 if you develop problems with breathing or chest pain.  You should CALL YOUR PHYSICIAN if you develop: Sharp stabbing pain or sudden change in vision in your operative eye. If you are unable to contact your doctor or the surgical center, you should go to the nearest emergency room or urgent care center. Physician's telephone # ___Dr. Villarreal [phone]_______________________    If any " questions arise, call your doctor. If your doctor is not available, please feel free to call Same Day Surgery at [phone]. You can also call the Health Hotline open 24 hours/day, 7 days/week and speak to a nurse at [phone] or [phone].     I acknowledge receipt and understanding of these Home Care Instructions.    ______________________________     _______________________________            Signature of Patient / Responsible Adult                                                       RN Signature    A registered nurse may call you a few days after your surgery to see how you are doing.   You may also receive a survey in the mail within the next two weeks and we ask that you take a few moments to complete and return the survey. Our goal is to provide you with very good care and we value your comments. Thank you for choosing [facility].        Medication List      ASK your doctor about these medications        Instructions    Morning Afternoon Evening Bedtime    acetaZOLAMIDE 125 MG Tabs   Commonly known as:  DIAMOX        Take 125 mg by mouth every day.   Dose:  125 mg                        aspirin 81 MG Chew chewable tablet   Commonly known as:  ASA        Take 1 Tab by mouth every day.   Dose:  81 mg                        atorvastatin 80 MG tablet   Commonly known as:  LIPITOR        Take 0.5 Tabs by mouth every evening.   Dose:  40 mg                        cabergoline 0.5 MG tablet   Commonly known as:  DOSTINEX        Take 1 Tab by mouth 2X A WEEK.   Dose:  0.5 mg                        cyanocobalamin 1000 MCG/ML Soln   Commonly known as:  VITAMIN B-12        1 mL by Intramuscular route every 7 days.   Dose:  1000 mcg                        FREESTYLE LITE strip   Generic drug:  glucose blood                             furosemide 20 MG Tabs   Commonly known as:  LASIX        Doctor's comments:  Takes with Weight gain 2-3#   Take 1 Tab by mouth as needed. Take additional tablet  as needed for weight gain of 2-3 lbs overnight or 5 lbs in a week.   Dose:  20 mg                        glimepiride 2 MG Tabs   Commonly known as:  AMARYL        Take 2 Tabs by mouth every morning.   Dose:  4 mg                        levothyroxine 175 MCG Tabs   Commonly known as:  SYNTHROID        Take 175 mcg by mouth Every morning on an empty stomach.   Dose:  175 mcg                        omeprazole 40 MG delayed-release capsule   Commonly known as:  PRILOSEC                             pioglitazone 30 MG Tabs   Commonly known as:  ACTOS        Take 1 Tab by mouth every day.   Dose:  30 mg                        pregabalin 150 MG Caps   Commonly known as:  LYRICA        Take 300 mg by mouth 2 times a day.   Dose:  300 mg                        spironolactone 25 MG Tabs   Commonly known as:  ALDACTONE        Take 1 Tab by mouth every day.   Dose:  25 mg                        tamsulosin 0.4 MG capsule   Commonly known as:  FLOMAX        Take 0.8 mg by mouth every evening.   Dose:  0.8 mg                        Testosterone Cypionate 100 MG/ML Soln        Doctor's comments:  .75mg IM Q week   75 mg by Intramuscular route every 7 days.   Dose:  75 mg                                Medication Information     Above and/or attached are the medications your physician expects you to take upon discharge. Review all of your home medications and newly ordered medications with your doctor and/or pharmacist. Follow medication instructions as directed by your doctor and/or pharmacist. Please keep your medication list with you and share with your physician. Update the information when medications are discontinued, doses are changed, or new medications (including over-the-counter products) are added; and carry medication information at all times in the event of emergency situations.        Resources     Quit Smoking / Tobacco Use:    I understand the use of any tobacco products increases my chance of suffering from future  heart disease or stroke and could cause other illnesses which may shorten my life. Quitting the use of tobacco products is the single most important thing I can do to improve my health. For further information on smoking / tobacco cessation call a Toll Free Quit Line at 1-334.819.2020 (*[employer] [place]) or 1-415.858.2956 (American [employer] Association) or you can access the web based program at www.lungusa.org.    [place] Tobacco Users Help Line:  (470) 973-9438       Toll Free: 1-976.235.8768  Quit Tobacco Program [facility] (501)864-0186    Crisis Hotline:    [place] Crisis Hotline:  9-788-OJLLMAL or 1-395.726.3109    [place] Crisis Hotline:    1-613.235.7960 or 249-347-5068    Discharge Survey:   Thank you for choosing [facility]. We hope we did everything we could to make your hospital stay a pleasant one. You may be receiving a survey and we would appreciate your time and participation in answering the questions. Your input is very valuable to us in our efforts to improve our service to our patients and their families.            Signatures     My signature on this form indicates that:    1. I acknowledge receipt and understanding of these Home Care Instruction.  2. My questions regarding this information have been answered to my satisfaction.  3. I have formulated a plan with my discharge nurse to obtain my prescribed medications for home.    __________________________________      __________________________________                   Patient Signature                                 Guardian/Responsible Adult Signature      __________________________________                 __________       ________                       Nurse Signature                                               Date                 Time

## 2017-08-15 NOTE — OR NURSING
1038 Received pt from the OR with Dr Ayers, left eye dilated, no bleeding or drainage to note.  VSS, in no signs of distress. Pt aware of POC.    1045 Wife at side, updated on POC< VSS, pt denies pain, discharge instructions given to pt and family, both verbalize understanding.       1115 Pt meets discharge criteria. Able to dress and steady on feet.    1117 Pt discharged, ambulatory to car.  All questions/concerns addressed.

## 2017-08-15 NOTE — DISCHARGE INSTRUCTIONS
HOME CARE INSTRUCTIONS FOR CATARACT SURGERY    ACTIVITY: Rest and take it easy for the first 24 hours. We strongly suggest that a responsible adult remain with you during that time. It is normal to feel sleepy. We encourage you to not do anything that requires balance, judgment or coordination. Be extra careful when walking (with a dilated eye, it is easier to trip and fall).     FOR 24 HOURS, DO NOT:       Drive, operate machinery or run household appliances.        Drink beer or alcoholic beverages.        Make important decisions or sign legal documents.     DIET: To avoid nausea, slowly advance diet as tolerated, avoiding spicy or greasy foods for the first meal.     MEDICATIONS: Resume taking daily medication. You may take Tylenol for mild discomfort, if needed.     SURGICAL DRESSING: Eye shield as instructed by your doctor. Dark glasses should be worn while in the sunlight.     Follow your Physician's instruction Sheet. Eye Kit Given    A follow-up appointment is scheduled with your doctor today at _2:30pm.     You should call 911 if you develop problems with breathing or chest pain.  You should CALL YOUR PHYSICIAN if you develop: Sharp stabbing pain or sudden change in vision in your operative eye. If you are unable to contact your doctor or the surgical center, you should go to the nearest emergency room or urgent care center. Physician's telephone # ___Dr. Villarreal [phone]_______________________    If any questions arise, call your doctor. If your doctor is not available, please feel free to call Same Day Surgery at [phone]. You can also call the [place] Hotline open 24 hours/day, 7 days/week and speak to a nurse at [phone] or [phone].     I acknowledge receipt and understanding of these Home Care Instructions.    ______________________________     _______________________________            Signature of Patient / Responsible Adult                                                       RN  Signature    A registered nurse may call you a few days after your surgery to see how you are doing.   You may also receive a survey in the mail within the next two weeks and we ask that you take a few moments to complete and return the survey. Our goal is to provide you with very good care and we value your comments. Thank you for choosing [facility].

## 2017-08-18 ENCOUNTER — TELEPHONE (OUTPATIENT)
Dept: CARDIOLOGY | Facility: MEDICAL CENTER | Age: 67
End: 2017-08-18

## 2017-08-18 NOTE — TELEPHONE ENCOUNTER
Patient wants lab order sent to hospital  Received: Today       JEROME Espinal/Elizabeth     Patient needs a lab order to be sent to [facility] in [place], but isn't sure what Dr Baldwin wants to order. He wants a call back at [phone] to confirm that the order has been sent.       Returned patient call. Discussing and call was disconnected.  Repeated call. Pt advised of middle October appt. Pt unsure about labs. Pt notified I would ask RO and advise him of reply and pt transferred to scheduling to move FU appt back 1 mos.

## 2017-08-21 NOTE — TELEPHONE ENCOUNTER
Message  Received: 3 days ago       JESSICA Llanes R.N.       Caller: Unspecified (3 days ago, 3:16 PM)                     Nothing needed for now       Patient called to inform no labs needed prior to next upcoming appt on 10/17. No answer LVM with information.

## 2017-10-12 DIAGNOSIS — E78.2 MIXED HYPERLIPIDEMIA: ICD-10-CM

## 2017-10-12 RX ORDER — ATORVASTATIN CALCIUM 80 MG/1
40 TABLET, FILM COATED ORAL EVERY EVENING
Qty: 30 TAB | Refills: 11 | Status: CANCELLED | OUTPATIENT
Start: 2017-10-12

## 2017-10-12 NOTE — TELEPHONE ENCOUNTER
Pt called re: atorvastatin dose.  Pt states he takes 40 mg. MD charting states 80 mg. Pt has a 10/17 appt. Pt states he has more than enough, months worth even at this time, because he has been cutting them in half. Will clarify dosing an obtain a new Rx at 10/17 appt. Pt states understanding and agrees with plan. 4/2017 lipid results discussed with patient.

## 2017-10-17 ENCOUNTER — OFFICE VISIT (OUTPATIENT)
Dept: CARDIOLOGY | Facility: MEDICAL CENTER | Age: 67
End: 2017-10-17
Payer: COMMERCIAL

## 2017-10-17 VITALS
DIASTOLIC BLOOD PRESSURE: 72 MMHG | BODY MASS INDEX: 32.13 KG/M2 | HEIGHT: 68 IN | OXYGEN SATURATION: 98 % | SYSTOLIC BLOOD PRESSURE: 120 MMHG | HEART RATE: 68 BPM | WEIGHT: 212 LBS

## 2017-10-17 DIAGNOSIS — R06.02 SOB (SHORTNESS OF BREATH): ICD-10-CM

## 2017-10-17 DIAGNOSIS — Z79.4 TYPE 2 DIABETES MELLITUS WITH COMPLICATION, WITH LONG-TERM CURRENT USE OF INSULIN (HCC): ICD-10-CM

## 2017-10-17 DIAGNOSIS — I46.9 CARDIAC ARREST (HCC): ICD-10-CM

## 2017-10-17 DIAGNOSIS — E11.9 TYPE 2 DIABETES MELLITUS WITHOUT COMPLICATION, WITHOUT LONG-TERM CURRENT USE OF INSULIN (HCC): ICD-10-CM

## 2017-10-17 DIAGNOSIS — I10 ESSENTIAL HYPERTENSION: ICD-10-CM

## 2017-10-17 DIAGNOSIS — E78.2 MIXED HYPERLIPIDEMIA: ICD-10-CM

## 2017-10-17 DIAGNOSIS — E11.8 TYPE 2 DIABETES MELLITUS WITH COMPLICATION, WITH LONG-TERM CURRENT USE OF INSULIN (HCC): ICD-10-CM

## 2017-10-17 DIAGNOSIS — I50.31 ACUTE DIASTOLIC HEART FAILURE (HCC): ICD-10-CM

## 2017-10-17 DIAGNOSIS — N17.1 ACUTE RENAL FAILURE WITH ACUTE CORTICAL NECROSIS (HCC): ICD-10-CM

## 2017-10-17 PROCEDURE — 99214 OFFICE O/P EST MOD 30 MIN: CPT | Performed by: INTERNAL MEDICINE

## 2017-10-17 RX ORDER — ATORVASTATIN CALCIUM 40 MG/1
40 TABLET, FILM COATED ORAL EVERY EVENING
Qty: 30 TAB | Refills: 11
Start: 2017-10-17 | End: 2017-10-18 | Stop reason: SDUPTHER

## 2017-10-17 ASSESSMENT — ENCOUNTER SYMPTOMS
WHEEZING: 0
CONSTITUTIONAL NEGATIVE: 1
CLAUDICATION: 0
PND: 0
STRIDOR: 0
SPUTUM PRODUCTION: 0
ORTHOPNEA: 0
SORE THROAT: 0
CARDIOVASCULAR NEGATIVE: 1
RESPIRATORY NEGATIVE: 1
MUSCULOSKELETAL NEGATIVE: 1
CHILLS: 0
DIZZINESS: 0
COUGH: 0
GASTROINTESTINAL NEGATIVE: 1
WEAKNESS: 0
HEMOPTYSIS: 0
PALPITATIONS: 0
BRUISES/BLEEDS EASILY: 0
NEUROLOGICAL NEGATIVE: 1
FEVER: 0
LOSS OF CONSCIOUSNESS: 0
EYES NEGATIVE: 1
SHORTNESS OF BREATH: 0

## 2017-10-17 NOTE — PROGRESS NOTES
Subjective:   Mark Lopez is a 67 y.o. male who presents today as a follow-up for sleep apnea and mild elevated  PA pressures and lower extremity edema. He continues to do well and is doing 7-8 hours a night of the CPAP.Otherwise he has been having no lower extremity edema. He was confused because he didn't know what dose of atorvastatin to be taking. Otherwise he's been having no changes to his shortness breath edema or chest pain.    Past Medical History:   Diagnosis Date   • CHF (congestive heart failure) (CMS-HCC) 8//12/16    diastolic   • Renal failure 2015    due to hemorrhage   • Hyperlipidemia 4/23/2012   • DM (diabetes mellitus) (CMS-HCC) 4/23/2012    oral meds only   • CHEST PAIN 4/23/2012   • Cancer (CMS-HCC) 2010    thyroid cancer   • Arthritis     all over   • Breath shortness     r/t chf   • Cataract     shandra eyes, no surgery yet   • Dental disorder     upper denture   • Disorder of thyroid     had surgery, on meds   • Heart burn    • Heart failure (CMS-HCC)    • High cholesterol    • Hypertension    • Indigestion    • Pain     compression fracture of L1   • Renal disorder     kidney stones   • Snoring      Past Surgical History:   Procedure Laterality Date   • CATARACT PHACO WITH IOL Left 8/15/2017    Procedure: CATARACT PHACO WITH IOL;  Surgeon: Osmel Villarreal M.D.;  Location: SURGERY SAME DAY [facility];  Service:    • INCISION AND DRAINAGE GENERAL  9/12/2015    Procedure: INCISION AND DRAINAGE GENERAL ABDOMINAL WASHOUT AND CLOSURE;  Surgeon: Mark Peters M.D.;  Location: SURGERY [facility];  Service:    • EXPLORATORY LAPAROTOMY  9/7/2015    Procedure: EXPLORATORY LAPAROTOMY;  Surgeon: Mark Peters M.D.;  Location: SURGERY [facility];  Service:    • EXPLORATORY LAPAROTOMY N/A 9/5/2015    Procedure: EXPLORATORY LAPAROTOMY;  Surgeon: Mark Peters M.D.;  Location: SURGERY [facility];  Service:    • OTHER  1974    vein stripping rt leg-- groin to ankle    • BUNIONECTOMY Left    • OTHER      Thyroid   • OTHER      Ear tumor   • OTHER ABDOMINAL SURGERY      gallbladder   • OTHER ORTHOPEDIC SURGERY      right shoulder x 2     Family History   Problem Relation Age of Onset   • Heart Attack Father    • Heart Disease Father    • Heart Disease Brother    • Heart Attack Brother      History   Smoking Status   • Never Smoker   Smokeless Tobacco   • Never Used     Allergies   Allergen Reactions   • Codeine Vomiting and Nausea     Outpatient Encounter Prescriptions as of 10/17/2017   Medication Sig Dispense Refill   • FREESTYLE LITE strip   0   • omeprazole (PRILOSEC) 40 MG delayed-release capsule   0   • pioglitazone (ACTOS) 30 MG Tab Take 1 Tab by mouth every day.     • furosemide (LASIX) 20 MG Tab Take 1 Tab by mouth as needed. Take additional tablet as needed for weight gain of 2-3 lbs overnight or 5 lbs in a week.     • acetaZOLAMIDE (DIAMOX) 125 MG Tab Take 125 mg by mouth every day.     • spironolactone (ALDACTONE) 25 MG Tab Take 1 Tab by mouth every day.     • cabergoline (DOSTINEX) 0.5 MG tablet Take 1 Tab by mouth 2X A WEEK.     • Testosterone Cypionate 100 MG/ML Solution 75 mg by Intramuscular route every 7 days.     • cyanocobalamin (VITAMIN B-12) 1000 MCG/ML Solution 1 mL by Intramuscular route every 7 days.     • glimepiride (AMARYL) 2 MG Tab Take 2 Tabs by mouth every morning. 60 Tab 3   • aspirin (ASA) 81 MG Chew Tab chewable tablet Take 1 Tab by mouth every day. 100 Tab 11   • pregabalin (LYRICA) 150 MG Cap Take 300 mg by mouth 2 times a day.     • levothyroxine (SYNTHROID) 175 MCG Tab Take 175 mcg by mouth Every morning on an empty stomach.     • tamsulosin (FLOMAX) 0.4 MG capsule Take 0.8 mg by mouth every evening.     • [DISCONTINUED] atorvastatin (LIPITOR) 80 MG tablet Take 0.5 Tabs by mouth every evening. 30 Tab 11     No facility-administered encounter medications on file as of 10/17/2017.      Review of Systems   Constitutional: Negative.  Negative for  "chills, fever and malaise/fatigue.   HENT: Negative.  Negative for sore throat.    Eyes: Negative.    Respiratory: Negative.  Negative for cough, hemoptysis, sputum production, shortness of breath, wheezing and stridor.    Cardiovascular: Negative.  Negative for chest pain, palpitations, orthopnea, claudication, leg swelling and PND.   Gastrointestinal: Negative.    Genitourinary: Negative.    Musculoskeletal: Negative.    Skin: Negative.    Neurological: Negative.  Negative for dizziness, loss of consciousness and weakness.   Endo/Heme/Allergies: Negative.  Does not bruise/bleed easily.   All other systems reviewed and are negative.       Objective:   /72   Pulse 68   Ht 1.727 m (5' 8\")   Wt 96.2 kg (212 lb)   SpO2 98%   BMI 32.23 kg/m²     Physical Exam   Constitutional: He is oriented to person, place, and time. He appears well-developed and well-nourished. No distress.   HENT:   Head: Normocephalic.   Mouth/Throat: Oropharynx is clear and moist.   Eyes: EOM are normal. Pupils are equal, round, and reactive to light. Right eye exhibits no discharge. Left eye exhibits no discharge. No scleral icterus.   Neck: Normal range of motion. Neck supple. No JVD present. No tracheal deviation present.   Cardiovascular: Normal rate, regular rhythm, S1 normal, S2 normal, normal heart sounds, intact distal pulses and normal pulses.  Exam reveals no gallop, no S3, no S4 and no friction rub.    No murmur heard.   No systolic murmur is present    No diastolic murmur is present   Pulses:       Carotid pulses are 2+ on the right side, and 2+ on the left side.       Radial pulses are 2+ on the right side, and 2+ on the left side.        Dorsalis pedis pulses are 2+ on the right side, and 2+ on the left side.        Posterior tibial pulses are 2+ on the right side, and 2+ on the left side.   Pulmonary/Chest: Effort normal and breath sounds normal. No respiratory distress. He has no wheezes. He has no rales.   Abdominal: " Soft. Bowel sounds are normal. He exhibits no distension and no mass. There is no tenderness. There is no rebound and no guarding.   Musculoskeletal: He exhibits no edema.   Neurological: He is alert and oriented to person, place, and time. No cranial nerve deficit.   Skin: Skin is warm and dry. He is not diaphoretic. No pallor.   Psychiatric: He has a normal mood and affect. His behavior is normal. Judgment and thought content normal.   Nursing note and vitals reviewed.      Assessment:     1. Acute diastolic heart failure (CMS-HCC)     2. Acute renal failure with acute cortical necrosis (CMS-[facility])     3. Cardiac arrest (CMS-[facility])     4. Type 2 diabetes mellitus without complication, without long-term current use of insulin (CMS-[facility])     5. Type 2 diabetes mellitus with complication, with long-term current use of insulin (CMS-[facility])     6. Essential hypertension     7. Mixed hyperlipidemia     8. SOB (shortness of breath)         Medical Decision Making:  Today's Assessment / Status / Plan:     67-year-old male with likely sleep apnea causing lower extremity edema along with diabetes. We will keep him on 40 mg a day of atorvastatin. Otherwise no changes to his medications today. We will see him back in one year.    1. LE edema    - cont elvira    - cont lasix    - cont acetozolamide    2. T2DM    - atorva 40    Thank for you allowing me to take part in your patient's care, please call should you have any questions or would like to discuss this patient.

## 2017-10-18 DIAGNOSIS — I46.9 CARDIAC ARREST (HCC): ICD-10-CM

## 2017-10-18 DIAGNOSIS — E11.9 TYPE 2 DIABETES MELLITUS WITHOUT COMPLICATION, WITHOUT LONG-TERM CURRENT USE OF INSULIN (HCC): ICD-10-CM

## 2017-10-18 RX ORDER — ATORVASTATIN CALCIUM 40 MG/1
40 TABLET, FILM COATED ORAL EVERY EVENING
Qty: 30 TAB | Refills: 11 | Status: SHIPPED | OUTPATIENT
Start: 2017-10-18 | End: 2022-11-04 | Stop reason: SDUPTHER

## 2017-11-10 ENCOUNTER — PATIENT MESSAGE (OUTPATIENT)
Dept: HEALTH INFORMATION MANAGEMENT | Facility: OTHER | Age: 67
End: 2017-11-10

## 2018-01-04 ENCOUNTER — HOSPITAL ENCOUNTER (OUTPATIENT)
Dept: HOSPITAL 8 - STAR | Age: 68
Discharge: HOME | End: 2018-01-04
Attending: UROLOGY
Payer: COMMERCIAL

## 2018-01-04 DIAGNOSIS — N40.1: ICD-10-CM

## 2018-01-04 DIAGNOSIS — R94.31: ICD-10-CM

## 2018-01-04 DIAGNOSIS — Z01.818: Primary | ICD-10-CM

## 2018-01-04 LAB
<PLATELET ESTIMATE>: ADEQUATE
ALBUMIN SERPL-MCNC: 4.3 G/DL (ref 3.4–5)
ALP SERPL-CCNC: 132 U/L (ref 45–117)
ALT SERPL-CCNC: 35 U/L (ref 12–78)
ANION GAP SERPL CALC-SCNC: 8 MMOL/L (ref 5–15)
ANISOCYTOSIS BLD QL SMEAR: (no result)
APTT BLD: 28 SECONDS (ref 25–31)
BASOPHILS # BLD AUTO: 0.06 X10^3/UL (ref 0–0.1)
BASOPHILS NFR BLD AUTO: 1 % (ref 0–1)
BILIRUB SERPL-MCNC: 1.3 MG/DL (ref 0.2–1)
CALCIUM SERPL-MCNC: 9 MG/DL (ref 8.5–10.1)
CHLORIDE SERPL-SCNC: 107 MMOL/L (ref 98–107)
CREAT SERPL-MCNC: 1.51 MG/DL (ref 0.7–1.3)
EOSINOPHIL # BLD AUTO: 0.19 X10^3/UL (ref 0–0.4)
EOSINOPHIL NFR BLD AUTO: 2 % (ref 1–7)
ERYTHROCYTE [DISTWIDTH] IN BLOOD BY AUTOMATED COUNT: 23.8 % (ref 9.4–14.8)
GIANT PLATELETS BLD QL SMEAR: (no result)
INR PPP: 1.04 (ref 0.93–1.1)
LG PLATELETS BLD QL SMEAR: (no result)
LYMPHOCYTES # BLD AUTO: 1.21 X10^3/UL (ref 1–3.4)
LYMPHOCYTES NFR BLD AUTO: 14 % (ref 22–44)
MCH RBC QN AUTO: 23.3 PG (ref 27.5–34.5)
MCHC RBC AUTO-ENTMCNC: 31.8 G/DL (ref 33.2–36.2)
MCV RBC AUTO: 73.4 FL (ref 81–97)
MD: (no result)
MICROCYTES BLD QL SMEAR: (no result)
MICROSCOPIC: (no result)
MONOCYTES # BLD AUTO: 0.65 X10^3/UL (ref 0.2–0.8)
MONOCYTES NFR BLD AUTO: 8 % (ref 2–9)
NEUTROPHILS # BLD AUTO: 6.48 X10^3/UL (ref 1.8–6.8)
NEUTROPHILS NFR BLD AUTO: 76 % (ref 42–75)
OVALOCYTES BLD QL SMEAR: (no result)
PLATELET # BLD AUTO: 190 X10^3/UL (ref 130–400)
PMV BLD AUTO: 10.3 FL (ref 7.4–10.4)
POLYCHROMASIA BLD QL SMEAR: (no result)
PROT SERPL-MCNC: 7.8 G/DL (ref 6.4–8.2)
PROTHROMBIN TIME: 10.8 SECONDS (ref 9.6–11.5)
RBC # BLD AUTO: 7.66 X10^6/UL (ref 4.38–5.82)

## 2018-01-04 PROCEDURE — 85025 COMPLETE CBC W/AUTO DIFF WBC: CPT

## 2018-01-04 PROCEDURE — 36415 COLL VENOUS BLD VENIPUNCTURE: CPT

## 2018-01-04 PROCEDURE — 87086 URINE CULTURE/COLONY COUNT: CPT

## 2018-01-04 PROCEDURE — 85730 THROMBOPLASTIN TIME PARTIAL: CPT

## 2018-01-04 PROCEDURE — 80053 COMPREHEN METABOLIC PANEL: CPT

## 2018-01-04 PROCEDURE — 81003 URINALYSIS AUTO W/O SCOPE: CPT

## 2018-01-04 PROCEDURE — 85610 PROTHROMBIN TIME: CPT

## 2018-01-04 PROCEDURE — 93005 ELECTROCARDIOGRAM TRACING: CPT

## 2018-01-15 ENCOUNTER — HOSPITAL ENCOUNTER (OUTPATIENT)
Dept: HOSPITAL 8 - OUT | Age: 68
End: 2018-01-15
Attending: UROLOGY
Payer: COMMERCIAL

## 2018-01-15 VITALS — SYSTOLIC BLOOD PRESSURE: 106 MMHG | DIASTOLIC BLOOD PRESSURE: 75 MMHG

## 2018-01-15 VITALS — HEIGHT: 68 IN | BODY MASS INDEX: 31.54 KG/M2 | WEIGHT: 208.12 LBS

## 2018-01-15 DIAGNOSIS — N32.0: ICD-10-CM

## 2018-01-15 DIAGNOSIS — E11.9: ICD-10-CM

## 2018-01-15 DIAGNOSIS — E03.9: ICD-10-CM

## 2018-01-15 DIAGNOSIS — I10: ICD-10-CM

## 2018-01-15 DIAGNOSIS — N40.1: Primary | ICD-10-CM

## 2018-01-15 PROCEDURE — 82962 GLUCOSE BLOOD TEST: CPT

## 2018-01-15 PROCEDURE — 88305 TISSUE EXAM BY PATHOLOGIST: CPT

## 2018-01-15 PROCEDURE — 52630 REMOVE PROSTATE REGROWTH: CPT

## 2018-02-21 DIAGNOSIS — E11.9 TYPE 2 DIABETES MELLITUS WITHOUT COMPLICATION, WITHOUT LONG-TERM CURRENT USE OF INSULIN (HCC): ICD-10-CM

## 2018-02-21 DIAGNOSIS — I46.9 CARDIAC ARREST (HCC): ICD-10-CM

## 2018-02-27 ENCOUNTER — TELEPHONE (OUTPATIENT)
Dept: CARDIOLOGY | Facility: MEDICAL CENTER | Age: 68
End: 2018-02-27

## 2018-02-27 NOTE — TELEPHONE ENCOUNTER
----- Message from Jessica Luther sent at 2/27/2018 12:06 PM PST -----  Regarding: check status on dental clearance   MILES/Loyda Lala's daughter, Nyasia is calling to check status on clearance sent 2/14 for father's dental cleaning. She would please like a call back at [phone].

## 2019-06-12 ENCOUNTER — OFFICE VISIT (OUTPATIENT)
Dept: CARDIOLOGY | Facility: MEDICAL CENTER | Age: 69
End: 2019-06-12
Payer: COMMERCIAL

## 2019-06-12 VITALS
OXYGEN SATURATION: 95 % | DIASTOLIC BLOOD PRESSURE: 88 MMHG | BODY MASS INDEX: 30.77 KG/M2 | HEIGHT: 68 IN | WEIGHT: 203 LBS | HEART RATE: 78 BPM | SYSTOLIC BLOOD PRESSURE: 126 MMHG

## 2019-06-12 DIAGNOSIS — N17.1 ACUTE RENAL FAILURE WITH ACUTE CORTICAL NECROSIS (HCC): ICD-10-CM

## 2019-06-12 DIAGNOSIS — I10 ESSENTIAL HYPERTENSION: ICD-10-CM

## 2019-06-12 DIAGNOSIS — I46.9 CARDIAC ARREST (HCC): ICD-10-CM

## 2019-06-12 DIAGNOSIS — Z79.4 TYPE 2 DIABETES MELLITUS WITH COMPLICATION, WITH LONG-TERM CURRENT USE OF INSULIN (HCC): ICD-10-CM

## 2019-06-12 DIAGNOSIS — I50.31 ACUTE DIASTOLIC HEART FAILURE (HCC): ICD-10-CM

## 2019-06-12 DIAGNOSIS — E11.8 TYPE 2 DIABETES MELLITUS WITH COMPLICATION, WITH LONG-TERM CURRENT USE OF INSULIN (HCC): ICD-10-CM

## 2019-06-12 DIAGNOSIS — R06.02 SOB (SHORTNESS OF BREATH): ICD-10-CM

## 2019-06-12 DIAGNOSIS — E11.9 TYPE 2 DIABETES MELLITUS WITHOUT COMPLICATION, WITHOUT LONG-TERM CURRENT USE OF INSULIN (HCC): ICD-10-CM

## 2019-06-12 PROCEDURE — 99214 OFFICE O/P EST MOD 30 MIN: CPT | Performed by: INTERNAL MEDICINE

## 2019-06-12 RX ORDER — PARICALCITOL 1 UG/1
1 CAPSULE, LIQUID FILLED ORAL DAILY
Refills: 0 | COMMUNITY
Start: 2019-06-04 | End: 2022-07-15

## 2019-06-12 RX ORDER — DOXERCALCIFEROL 2.5 UG/1
2.5 CAPSULE ORAL DAILY
Refills: 0 | COMMUNITY
Start: 2019-04-03 | End: 2022-07-15

## 2019-06-12 RX ORDER — DOCUSATE SODIUM 100 MG/1
100 CAPSULE, LIQUID FILLED ORAL
Refills: 0 | COMMUNITY
Start: 2019-04-19 | End: 2022-11-04

## 2019-06-12 ASSESSMENT — MINNESOTA LIVING WITH HEART FAILURE QUESTIONNAIRE (MLHF)
DIFFICULTY SOCIALIZING WITH FAMILY OR FRIENDS: 0
DIFFICULTY SLEEPING WELL AT NIGHT: 0
MAKING YOU FEEL DEPRESSED: 0
GIVING YOU SIDE EFFECTS FROM TREATMENTS: 0
WALKING ABOUT OR CLIMBING STAIRS DIFFICULT: 1
DIFFICULTY TO CONCENTRATE OR REMEMBERING THINGS: 3
TIRED, FATIGUED OR LOW ON ENERGY: 3
DIFFICULTY WITH SEXUAL ACTIVITIES: 0
LOSS OF SELF CONTROL IN YOUR LIFE: 1
TOTAL_SCORE: 13
SWELLING IN ANKLES OR LEGS: 0
DIFFICULTY WITH RECREATIONAL PASTIMES, SPORTS, HOBBIES: 0
MAKING YOU SHORT OF BREATH: 1
HAVING TO SIT OR LIE DOWN DURING THE DAY: 1
FEELING LIKE A BURDEN TO FAMILY AND FRIENDS: 0
MAKING YOU WORRY: 1
DIFFICULTY GOING AWAY FROM HOME: 0
MAKING YOU STAY IN A HOSPITAL: 0
COSTING YOU MONEY FOR MEDICAL CARE: 0
WORKING AROUND THE HOUSE OR YARD DIFFICULT: 0
DIFFICULTY WORKING TO EARN A LIVING: 2
EATING LESS FOODS YOU LIKE: 0

## 2019-06-12 ASSESSMENT — ENCOUNTER SYMPTOMS
WHEEZING: 0
CARDIOVASCULAR NEGATIVE: 1
CHILLS: 0
SPUTUM PRODUCTION: 0
COUGH: 0
PALPITATIONS: 0
WEAKNESS: 0
MUSCULOSKELETAL NEGATIVE: 1
NEUROLOGICAL NEGATIVE: 1
HEMOPTYSIS: 0
PND: 0
DIZZINESS: 0
STRIDOR: 0
ORTHOPNEA: 0
SHORTNESS OF BREATH: 0
FEVER: 0
SORE THROAT: 0
BRUISES/BLEEDS EASILY: 0
LOSS OF CONSCIOUSNESS: 0
EYES NEGATIVE: 1
RESPIRATORY NEGATIVE: 1
CLAUDICATION: 0
GASTROINTESTINAL NEGATIVE: 1

## 2019-06-12 ASSESSMENT — 6 MINUTE WALK TEST (6MWT): TOTAL DISTANCE WALKED (METERS): 348

## 2019-06-12 NOTE — PROGRESS NOTES
Chief Complaint   Patient presents with   • CHF (Diastolic)     F/v: 1 YR       Subjective:   Mark Lopez is a 68 y.o. male who presents today as a follow-up.  Since we last saw him he has been doing well.  He did had not a A1c checked recently that showed an A1c of 9.2 with a creatinine of 2.0.  His labs also showed polycythemia with hematocrit of 25.  He did recently switch his CPAP from a full facemask back to a nasal pillow.  Is unclear why he did this.  As approximately 1-1/2 months ago.  Since then he is reported significant daytime fatigue.  He is optically short of breath or having syncope.  He just says that during the day his wife will notice that he just simply fall asleep in the middle of conversation or fall asleep easily with no reason whatsoever.  When he is doing activities is not reporting any shortness of breath or any other symptoms.  He has not had any chest pain.  He is able to tolerate his ADLs with no functional limitations.  He was able to complete 348 m of his 6-minute walk test today.    Past Medical History:   Diagnosis Date   • Arthritis     all over   • Breath shortness     r/t chf   • Cancer ([facility]) 2010    thyroid cancer   • Cataract     shandra eyes, no surgery yet   • CHEST PAIN 4/23/2012   • CHF (congestive heart failure) ([facility]) 8//12/16    diastolic   • Dental disorder     upper denture   • Disorder of thyroid     had surgery, on meds   • DM (diabetes mellitus) ([facility]) 4/23/2012    oral meds only   • Heart burn    • Heart failure ([facility])    • High cholesterol    • Hyperlipidemia 4/23/2012   • Hypertension    • Indigestion    • Pain     compression fracture of L1   • Renal disorder     kidney stones   • Renal failure 2015    due to hemorrhage   • Snoring      Past Surgical History:   Procedure Laterality Date   • CATARACT PHACO WITH IOL Left 8/15/2017    Procedure: CATARACT PHACO WITH IOL;  Surgeon: Osmel Villarreal M.D.;  Location: SURGERY SAME DAY [facility];  Service:    •  INCISION AND DRAINAGE GENERAL  9/12/2015    Procedure: INCISION AND DRAINAGE GENERAL ABDOMINAL WASHOUT AND CLOSURE;  Surgeon: Mark Peters M.D.;  Location: SURGERY [facility] [place] [facility];  Service:    • EXPLORATORY LAPAROTOMY  9/7/2015    Procedure: EXPLORATORY LAPAROTOMY;  Surgeon: Mark Peters M.D.;  Location: SURGERY [facility];  Service:    • EXPLORATORY LAPAROTOMY N/A 9/5/2015    Procedure: EXPLORATORY LAPAROTOMY;  Surgeon: Mark Peters M.D.;  Location: SURGERY [facility];  Service:    • OTHER  1974    vein stripping rt leg-- groin to ankle   • BUNIONECTOMY Left    • OTHER      Thyroid   • OTHER      Ear tumor   • OTHER ABDOMINAL SURGERY      gallbladder   • OTHER ORTHOPEDIC SURGERY      right shoulder x 2     Family History   Problem Relation Age of Onset   • Heart Attack Father    • Heart Disease Father    • Heart Disease Brother    • Heart Attack Brother      Social History     Social History   • Marital status:      Spouse name: N/A   • Number of children: N/A   • Years of education: N/A     Occupational History   • Not on file.     Social History Main Topics   • Smoking status: Never Smoker   • Smokeless tobacco: Never Used   • Alcohol use No   • Drug use: Yes     Types: Marijuana      Comment: medical marijuana   • Sexual activity: Not on file      Comment: Medical      Other Topics Concern   • Not on file     Social History Narrative   • No narrative on file     Allergies   Allergen Reactions   • Codeine Vomiting and Nausea     Outpatient Encounter Prescriptions as of 6/12/2019   Medication Sig Dispense Refill   • RA COL-RITE 100 MG Cap Take 100 mg by mouth.  0   • paricalcitol (ZEMPLAR) 1 MCG capsule Take 1 mcg by mouth every day.  0   • doxercalciferol (HECTORAL) 2.5 MCG Cap Take 2.5 mcg by mouth every day.  0   • atorvastatin (LIPITOR) 40 MG Tab Take 1 Tab by mouth every evening. 30 Tab 11   • FREESTYLE LITE strip   0   • omeprazole (PRILOSEC) 40 MG delayed-release  capsule Take 40 mg by mouth every day.  0   • pioglitazone (ACTOS) 30 MG Tab Take 1 Tab by mouth every day.     • acetaZOLAMIDE (DIAMOX) 125 MG Tab Take 125 mg by mouth every day.     • spironolactone (ALDACTONE) 25 MG Tab Take 1 Tab by mouth every day.     • Testosterone Cypionate 100 MG/ML Solution 75 mg by Intramuscular route every 7 days.     • cyanocobalamin (VITAMIN B-12) 1000 MCG/ML Solution 1 mL by Intramuscular route every 7 days.     • glimepiride (AMARYL) 2 MG Tab Take 2 Tabs by mouth every morning. 60 Tab 3   • aspirin (ASA) 81 MG Chew Tab chewable tablet Take 1 Tab by mouth every day. 100 Tab 11   • pregabalin (LYRICA) 150 MG Cap Take 300 mg by mouth 2 times a day.     • levothyroxine (SYNTHROID) 175 MCG Tab Take 175 mcg by mouth Every morning on an empty stomach.     • [DISCONTINUED] furosemide (LASIX) 20 MG Tab Take 1 Tab by mouth as needed. Take additional tablet as needed for weight gain of 2-3 lbs overnight or 5 lbs in a week.     • [DISCONTINUED] cabergoline (DOSTINEX) 0.5 MG tablet Take 1 Tab by mouth 2X A WEEK.     • [DISCONTINUED] tamsulosin (FLOMAX) 0.4 MG capsule Take 0.8 mg by mouth every evening.       No facility-administered encounter medications on file as of 6/12/2019.      Review of Systems   Constitutional: Positive for malaise/fatigue. Negative for chills and fever.   HENT: Negative.  Negative for sore throat.    Eyes: Negative.    Respiratory: Negative.  Negative for cough, hemoptysis, sputum production, shortness of breath, wheezing and stridor.    Cardiovascular: Negative.  Negative for chest pain, palpitations, orthopnea, claudication, leg swelling and PND.   Gastrointestinal: Negative.    Genitourinary: Negative.    Musculoskeletal: Negative.    Skin: Negative.    Neurological: Negative.  Negative for dizziness, loss of consciousness and weakness.   Endo/Heme/Allergies: Negative.  Does not bruise/bleed easily.   All other systems reviewed and are negative.       Objective:   BP  "126/88 (BP Location: Left arm, Patient Position: Sitting, BP Cuff Size: Adult)   Pulse 78   Ht 1.727 m (5' 8\")   Wt 92.1 kg (203 lb)   SpO2 95%   BMI 30.87 kg/m²     Physical Exam   Constitutional: He appears well-developed and well-nourished. No distress.   HENT:   Head: Normocephalic and atraumatic.   Right Ear: External ear normal.   Left Ear: External ear normal.   Nose: Nose normal.   Mouth/Throat: No oropharyngeal exudate.   Eyes: Pupils are equal, round, and reactive to light. Conjunctivae and EOM are normal. Right eye exhibits no discharge. Left eye exhibits no discharge. No scleral icterus.   Neck: Neck supple. No JVD present.   Cardiovascular: Normal rate, regular rhythm and intact distal pulses.  Exam reveals no gallop and no friction rub.    No murmur heard.  Pulmonary/Chest: Effort normal. No stridor. No respiratory distress. He has no wheezes. He has no rales. He exhibits no tenderness.   Abdominal: Soft. He exhibits no distension. There is no guarding.   Musculoskeletal: Normal range of motion. He exhibits no edema, tenderness or deformity.   Neurological: He is alert. He has normal reflexes. He displays normal reflexes. No cranial nerve deficit. He exhibits normal muscle tone. Coordination normal.   Skin: Skin is warm and dry. No rash noted. He is not diaphoretic. No erythema. No pallor.   Psychiatric: He has a normal mood and affect. His behavior is normal. Judgment and thought content normal.   Nursing note and vitals reviewed.      Assessment:     1. Cardiac arrest (HCC)     2. Acute renal failure with acute cortical necrosis (HCC)     3. Acute diastolic heart failure (HCC)  EC-ECHOCARDIOGRAM COMPLETE W/O CONT   4. Type 2 diabetes mellitus without complication, without long-term current use of insulin (HCC)  EC-ECHOCARDIOGRAM COMPLETE W/O CONT   5. Type 2 diabetes mellitus with complication, with long-term current use of insulin (HCC)  EC-ECHOCARDIOGRAM COMPLETE W/O CONT   6. Essential " hypertension     7. SOB (shortness of breath)         Medical Decision Making:  Today's Assessment / Status / Plan:     68-year-old male with sleep apnea with a change in his symptoms since going back to a nasal pillow.  Although I do not feel that this is cardiac we will go ahead and order an echocardiogram.  In the meantime of asked him go back to his full facial mask and if this relieves his symptoms and what he needs to do is to simply stay with that.  If that happens he can cancel his echocardiogram.  I also explained to him that his high blood sugar can also be a cause of his symptoms of though doubtful.  We will await his change in his mask and see how he is doing.    Thank for you allowing me to take part in your patient's care, please call should you have any questions or would like to discuss this patient.

## 2019-06-12 NOTE — LETTER
[facility]   [address]  [place] [address]  Phone: [phone]  Fax: [phone]              Mark Lopez  1950    Encounter Date: 6/12/2019    Paul Baldwin M.D.          PROGRESS NOTE:  Chief Complaint   Patient presents with   • CHF (Diastolic)     F/v: 1 YR       Subjective:   Mark Lopez is a 68 y.o. male who presents today as a follow-up.  Since we last saw him he has been doing well.  He did had not a A1c checked recently that showed an A1c of 9.2 with a creatinine of 2.0.  His labs also showed polycythemia with hematocrit of 25.  He did recently switch his CPAP from a full facemask back to a nasal pillow.  Is unclear why he did this.  As approximately 1-1/2 months ago.  Since then he is reported significant daytime fatigue.  He is optically short of breath or having syncope.  He just says that during the day his wife will notice that he just simply fall asleep in the middle of conversation or fall asleep easily with no reason whatsoever.  When he is doing activities is not reporting any shortness of breath or any other symptoms.  He has not had any chest pain.  He is able to tolerate his ADLs with no functional limitations.  He was able to complete 348 m of his 6-minute walk test today.    Past Medical History:   Diagnosis Date   • Arthritis     all over   • Breath shortness     r/t chf   • Cancer ([facility]) 2010    thyroid cancer   • Cataract     shandra eyes, no surgery yet   • CHEST PAIN 4/23/2012   • CHF (congestive heart failure) ([facility]) 8//12/16    diastolic   • Dental disorder     upper denture   • Disorder of thyroid     had surgery, on meds   • DM (diabetes mellitus) ([facility]) 4/23/2012    oral meds only   • Heart burn    • Heart failure ([facility])    • High cholesterol    • Hyperlipidemia 4/23/2012   • Hypertension    • Indigestion    • Pain     compression fracture of L1   • Renal disorder     kidney stones   • Renal failure  2015    due to hemorrhage   • Snoring      Past Surgical History:   Procedure Laterality Date   • CATARACT PHACO WITH IOL Left 8/15/2017    Procedure: CATARACT PHACO WITH IOL;  Surgeon: Osmel Villarreal M.D.;  Location: SURGERY SAME DAY [facility];  Service:    • INCISION AND DRAINAGE GENERAL  9/12/2015    Procedure: INCISION AND DRAINAGE GENERAL ABDOMINAL WASHOUT AND CLOSURE;  Surgeon: Mark Peters M.D.;  Location: SURGERY [facility];  Service:    • EXPLORATORY LAPAROTOMY  9/7/2015    Procedure: EXPLORATORY LAPAROTOMY;  Surgeon: Mark Peters M.D.;  Location: SURGERY [facility];  Service:    • EXPLORATORY LAPAROTOMY N/A 9/5/2015    Procedure: EXPLORATORY LAPAROTOMY;  Surgeon: Mark Peters M.D.;  Location: SURGERY [facility];  Service:    • OTHER  1974    vein stripping rt leg-- groin to ankle   • BUNIONECTOMY Left    • OTHER      Thyroid   • OTHER      Ear tumor   • OTHER ABDOMINAL SURGERY      gallbladder   • OTHER ORTHOPEDIC SURGERY      right shoulder x 2     Family History   Problem Relation Age of Onset   • Heart Attack Father    • Heart Disease Father    • Heart Disease Brother    • Heart Attack Brother      Social History     Social History   • Marital status:      Spouse name: N/A   • Number of children: N/A   • Years of education: N/A     Occupational History   • Not on file.     Social History Main Topics   • Smoking status: Never Smoker   • Smokeless tobacco: Never Used   • Alcohol use No   • Drug use: Yes     Types: Marijuana      Comment: medical marijuana   • Sexual activity: Not on file      Comment: Medical      Other Topics Concern   • Not on file     Social History Narrative   • No narrative on file     Allergies   Allergen Reactions   • Codeine Vomiting and Nausea     Outpatient Encounter Prescriptions as of 6/12/2019   Medication Sig Dispense Refill   • RA COL-RITE 100 MG Cap Take 100 mg by mouth.  0   • paricalcitol (ZEMPLAR) 1 MCG capsule Take 1  mcg by mouth every day.  0   • doxercalciferol (HECTORAL) 2.5 MCG Cap Take 2.5 mcg by mouth every day.  0   • atorvastatin (LIPITOR) 40 MG Tab Take 1 Tab by mouth every evening. 30 Tab 11   • FREESTYLE LITE strip   0   • omeprazole (PRILOSEC) 40 MG delayed-release capsule Take 40 mg by mouth every day.  0   • pioglitazone (ACTOS) 30 MG Tab Take 1 Tab by mouth every day.     • acetaZOLAMIDE (DIAMOX) 125 MG Tab Take 125 mg by mouth every day.     • spironolactone (ALDACTONE) 25 MG Tab Take 1 Tab by mouth every day.     • Testosterone Cypionate 100 MG/ML Solution 75 mg by Intramuscular route every 7 days.     • cyanocobalamin (VITAMIN B-12) 1000 MCG/ML Solution 1 mL by Intramuscular route every 7 days.     • glimepiride (AMARYL) 2 MG Tab Take 2 Tabs by mouth every morning. 60 Tab 3   • aspirin (ASA) 81 MG Chew Tab chewable tablet Take 1 Tab by mouth every day. 100 Tab 11   • pregabalin (LYRICA) 150 MG Cap Take 300 mg by mouth 2 times a day.     • levothyroxine (SYNTHROID) 175 MCG Tab Take 175 mcg by mouth Every morning on an empty stomach.     • [DISCONTINUED] furosemide (LASIX) 20 MG Tab Take 1 Tab by mouth as needed. Take additional tablet as needed for weight gain of 2-3 lbs overnight or 5 lbs in a week.     • [DISCONTINUED] cabergoline (DOSTINEX) 0.5 MG tablet Take 1 Tab by mouth 2X A WEEK.     • [DISCONTINUED] tamsulosin (FLOMAX) 0.4 MG capsule Take 0.8 mg by mouth every evening.       No facility-administered encounter medications on file as of 6/12/2019.      Review of Systems   Constitutional: Positive for malaise/fatigue. Negative for chills and fever.   HENT: Negative.  Negative for sore throat.    Eyes: Negative.    Respiratory: Negative.  Negative for cough, hemoptysis, sputum production, shortness of breath, wheezing and stridor.    Cardiovascular: Negative.  Negative for chest pain, palpitations, orthopnea, claudication, leg swelling and PND.   Gastrointestinal: Negative.    Genitourinary: Negative.     "  Musculoskeletal: Negative.    Skin: Negative.    Neurological: Negative.  Negative for dizziness, loss of consciousness and weakness.   Endo/Heme/Allergies: Negative.  Does not bruise/bleed easily.   All other systems reviewed and are negative.       Objective:   /88 (BP Location: Left arm, Patient Position: Sitting, BP Cuff Size: Adult)   Pulse 78   Ht 1.727 m (5' 8\")   Wt 92.1 kg (203 lb)   SpO2 95%   BMI 30.87 kg/m²      Physical Exam   Constitutional: He appears well-developed and well-nourished. No distress.   HENT:   Head: Normocephalic and atraumatic.   Right Ear: External ear normal.   Left Ear: External ear normal.   Nose: Nose normal.   Mouth/Throat: No oropharyngeal exudate.   Eyes: Pupils are equal, round, and reactive to light. Conjunctivae and EOM are normal. Right eye exhibits no discharge. Left eye exhibits no discharge. No scleral icterus.   Neck: Neck supple. No JVD present.   Cardiovascular: Normal rate, regular rhythm and intact distal pulses.  Exam reveals no gallop and no friction rub.    No murmur heard.  Pulmonary/Chest: Effort normal. No stridor. No respiratory distress. He has no wheezes. He has no rales. He exhibits no tenderness.   Abdominal: Soft. He exhibits no distension. There is no guarding.   Musculoskeletal: Normal range of motion. He exhibits no edema, tenderness or deformity.   Neurological: He is alert. He has normal reflexes. He displays normal reflexes. No cranial nerve deficit. He exhibits normal muscle tone. Coordination normal.   Skin: Skin is warm and dry. No rash noted. He is not diaphoretic. No erythema. No pallor.   Psychiatric: He has a normal mood and affect. His behavior is normal. Judgment and thought content normal.   Nursing note and vitals reviewed.      Assessment:     1. Cardiac arrest (HCC)     2. Acute renal failure with acute cortical necrosis (HCC)     3. Acute diastolic heart failure (HCC)  EC-ECHOCARDIOGRAM COMPLETE W/O CONT   4. Type 2 " diabetes mellitus without complication, without long-term current use of insulin (HCC)  EC-ECHOCARDIOGRAM COMPLETE W/O CONT   5. Type 2 diabetes mellitus with complication, with long-term current use of insulin (HCC)  EC-ECHOCARDIOGRAM COMPLETE W/O CONT   6. Essential hypertension     7. SOB (shortness of breath)         Medical Decision Making:  Today's Assessment / Status / Plan:     68-year-old male with sleep apnea with a change in his symptoms since going back to a nasal pillow.  Although I do not feel that this is cardiac we will go ahead and order an echocardiogram.  In the meantime of asked him go back to his full facial mask and if this relieves his symptoms and what he needs to do is to simply stay with that.  If that happens he can cancel his echocardiogram.  I also explained to him that his high blood sugar can also be a cause of his symptoms of though doubtful.  We will await his change in his mask and see how he is doing.    Thank for you allowing me to take part in your patient's care, please call should you have any questions or would like to discuss this patient.      SAAD Carroll.WON.  [address] [place] [id number]  VIA Facsimile: [phone]

## 2019-06-28 ENCOUNTER — HOSPITAL ENCOUNTER (OUTPATIENT)
Dept: CARDIOLOGY | Facility: MEDICAL CENTER | Age: 69
End: 2019-06-28
Attending: INTERNAL MEDICINE
Payer: COMMERCIAL

## 2019-06-28 DIAGNOSIS — I50.31 ACUTE DIASTOLIC HEART FAILURE (HCC): ICD-10-CM

## 2019-06-28 DIAGNOSIS — E11.8 TYPE 2 DIABETES MELLITUS WITH COMPLICATION, WITH LONG-TERM CURRENT USE OF INSULIN (HCC): ICD-10-CM

## 2019-06-28 DIAGNOSIS — E11.9 TYPE 2 DIABETES MELLITUS WITHOUT COMPLICATION, WITHOUT LONG-TERM CURRENT USE OF INSULIN (HCC): ICD-10-CM

## 2019-06-28 DIAGNOSIS — Z79.4 TYPE 2 DIABETES MELLITUS WITH COMPLICATION, WITH LONG-TERM CURRENT USE OF INSULIN (HCC): ICD-10-CM

## 2019-06-28 LAB
LV EJECT FRACT  99904: 60
LV EJECT FRACT MOD 2C 99903: 66.5
LV EJECT FRACT MOD 4C 99902: 58.5
LV EJECT FRACT MOD BP 99901: 62.21

## 2019-06-28 PROCEDURE — 93306 TTE W/DOPPLER COMPLETE: CPT | Mod: 26 | Performed by: INTERNAL MEDICINE

## 2019-06-28 PROCEDURE — 93306 TTE W/DOPPLER COMPLETE: CPT

## 2019-07-03 ENCOUNTER — OFFICE VISIT (OUTPATIENT)
Dept: CARDIOLOGY | Facility: MEDICAL CENTER | Age: 69
End: 2019-07-03
Payer: COMMERCIAL

## 2019-07-03 VITALS
BODY MASS INDEX: 30.65 KG/M2 | WEIGHT: 202.2 LBS | DIASTOLIC BLOOD PRESSURE: 74 MMHG | HEIGHT: 68 IN | OXYGEN SATURATION: 97 % | SYSTOLIC BLOOD PRESSURE: 126 MMHG | HEART RATE: 80 BPM

## 2019-07-03 DIAGNOSIS — I10 ESSENTIAL HYPERTENSION: ICD-10-CM

## 2019-07-03 DIAGNOSIS — I50.9 HEART FAILURE, NYHA CLASS 1 (HCC): ICD-10-CM

## 2019-07-03 DIAGNOSIS — E78.2 MIXED HYPERLIPIDEMIA: ICD-10-CM

## 2019-07-03 DIAGNOSIS — E11.9 TYPE 2 DIABETES MELLITUS WITHOUT COMPLICATION, WITHOUT LONG-TERM CURRENT USE OF INSULIN (HCC): ICD-10-CM

## 2019-07-03 DIAGNOSIS — N18.30 CKD (CHRONIC KIDNEY DISEASE), STAGE III (HCC): ICD-10-CM

## 2019-07-03 DIAGNOSIS — R06.02 SOB (SHORTNESS OF BREATH): ICD-10-CM

## 2019-07-03 DIAGNOSIS — I50.30 ACC/AHA STAGE C DIASTOLIC CONGESTIVE HEART FAILURE (HCC): ICD-10-CM

## 2019-07-03 PROCEDURE — 99214 OFFICE O/P EST MOD 30 MIN: CPT | Performed by: NURSE PRACTITIONER

## 2019-07-03 ASSESSMENT — ENCOUNTER SYMPTOMS
DEPRESSION: 0
PND: 0
DIZZINESS: 0
PALPITATIONS: 0
ABDOMINAL PAIN: 0
COUGH: 0
FEVER: 0
MYALGIAS: 0
ORTHOPNEA: 0
NERVOUS/ANXIOUS: 0
CLAUDICATION: 0
SHORTNESS OF BREATH: 0

## 2019-07-03 NOTE — PROGRESS NOTES
Chief Complaint   Patient presents with   • Congestive Heart Failure       Subjective:   Mark Lopez is a 68 y.o. male who presents today for follow up on his fatigue and echo results with his wife, Rachael.     Patient of Dr. Baldwin.  He was last seen in clinic on 6/12/2019.  During his last visit, the patient was reporting fatigue to the point where he would fall asleep after he stopped his activities.  Patient was sent for repeat echocardiogram and recommended to use his full mask for his Sleep apnea.    Patient reports he is back to his full mask and had his CPAP machine checked.  Patient reports no changes in his symptoms.  Patient also states he is active at home, walks on the treadmill 30 minutes a day.    Patient also reports some memory problems, but denies any signs of depression, stress or anxiety.  He denies chest pain, palpitations, orthopnea, PND, lower extremity edema, shortness of breath or dizziness/lightheadedness.    He reports his home weights are stable between 195-199 pounds.    He only takes his diuretic as needed for weight gain more than 3 pounds in 1 day or 5 pounds 1 week.  He has not had any recent use.    Patient reports he has had blood work from his endocrinologist for work-up on his fatigue as well with no conclusive results.    Additonally, patient has the following medical problems:    -History of cardiac arrest after hemorrhage secondary to cholecystectomy 2015    -History of hypertension    -Diabetes    -Hypothyroidism    -Sleep apnea, using CPAP regularly    -Hyperlipidemia  Past Medical History:   Diagnosis Date   • Arthritis     all over   • Breath shortness     r/t chf   • Cancer ([facility]) 2010    thyroid cancer   • Cataract     shandra eyes, no surgery yet   • CHEST PAIN 4/23/2012   • CHF (congestive heart failure) ([facility]) 8//12/16    diastolic   • Dental disorder     upper denture   • Disorder of thyroid     had surgery, on meds   • DM (diabetes mellitus) ([facility]) 4/23/2012     oral meds only   • Heart burn    • Heart failure (HCC)    • High cholesterol    • Hyperlipidemia 4/23/2012   • Hypertension    • Indigestion    • Pain     compression fracture of L1   • Renal disorder     kidney stones   • Renal failure 2015    due to hemorrhage   • Snoring      Past Surgical History:   Procedure Laterality Date   • CATARACT PHACO WITH IOL Left 8/15/2017    Procedure: CATARACT PHACO WITH IOL;  Surgeon: Osmel Villarreal M.D.;  Location: SURGERY SAME DAY [facility];  Service:    • INCISION AND DRAINAGE GENERAL  9/12/2015    Procedure: INCISION AND DRAINAGE GENERAL ABDOMINAL WASHOUT AND CLOSURE;  Surgeon: Mark Peters M.D.;  Location: SURGERY [facility];  Service:    • EXPLORATORY LAPAROTOMY  9/7/2015    Procedure: EXPLORATORY LAPAROTOMY;  Surgeon: Mark Peters M.D.;  Location: SURGERY [facility];  Service:    • EXPLORATORY LAPAROTOMY N/A 9/5/2015    Procedure: EXPLORATORY LAPAROTOMY;  Surgeon: Mark Peters M.D.;  Location: SURGERY [facility];  Service:    • OTHER  1974    vein stripping rt leg-- groin to ankle   • BUNIONECTOMY Left    • OTHER      Thyroid   • OTHER      Ear tumor   • OTHER ABDOMINAL SURGERY      gallbladder   • OTHER ORTHOPEDIC SURGERY      right shoulder x 2     Family History   Problem Relation Age of Onset   • Heart Attack Father    • Heart Disease Father    • Heart Disease Brother    • Heart Attack Brother      Social History     Social History   • Marital status:      Spouse name: N/A   • Number of children: N/A   • Years of education: N/A     Occupational History   • Not on file.     Social History Main Topics   • Smoking status: Never Smoker   • Smokeless tobacco: Never Used   • Alcohol use No   • Drug use: Yes     Types: Marijuana      Comment: medical marijuana   • Sexual activity: Not on file      Comment: Medical      Other Topics Concern   • Not on file     Social History Narrative   • No narrative on file     Allergies    Allergen Reactions   • Codeine Vomiting and Nausea     Outpatient Encounter Prescriptions as of 7/3/2019   Medication Sig Dispense Refill   • RA COL-RITE 100 MG Cap Take 100 mg by mouth.  0   • paricalcitol (ZEMPLAR) 1 MCG capsule Take 1 mcg by mouth every day.  0   • doxercalciferol (HECTORAL) 2.5 MCG Cap Take 2.5 mcg by mouth every day.  0   • atorvastatin (LIPITOR) 40 MG Tab Take 1 Tab by mouth every evening. 30 Tab 11   • FREESTYLE LITE strip   0   • omeprazole (PRILOSEC) 40 MG delayed-release capsule Take 40 mg by mouth every day.  0   • pioglitazone (ACTOS) 30 MG Tab Take 1 Tab by mouth every day.     • acetaZOLAMIDE (DIAMOX) 125 MG Tab Take 125 mg by mouth every day.     • spironolactone (ALDACTONE) 25 MG Tab Take 1 Tab by mouth every day.     • Testosterone Cypionate 100 MG/ML Solution 75 mg by Intramuscular route every 7 days.     • cyanocobalamin (VITAMIN B-12) 1000 MCG/ML Solution 1 mL by Intramuscular route every 7 days.     • glimepiride (AMARYL) 2 MG Tab Take 2 Tabs by mouth every morning. 60 Tab 3   • aspirin (ASA) 81 MG Chew Tab chewable tablet Take 1 Tab by mouth every day. 100 Tab 11   • pregabalin (LYRICA) 150 MG Cap Take 300 mg by mouth 2 times a day.     • levothyroxine (SYNTHROID) 175 MCG Tab Take 175 mcg by mouth Every morning on an empty stomach.       No facility-administered encounter medications on file as of 7/3/2019.      Review of Systems   Constitutional: Positive for malaise/fatigue. Negative for fever.   Respiratory: Negative for cough and shortness of breath.    Cardiovascular: Negative for chest pain, palpitations, orthopnea, claudication, leg swelling and PND.   Gastrointestinal: Negative for abdominal pain.   Musculoskeletal: Negative for myalgias.   Neurological: Negative for dizziness.   Psychiatric/Behavioral: Negative for depression. The patient is not nervous/anxious.    All other systems reviewed and are negative.       Objective:   /74 (BP Location: Left arm,  "Patient Position: Sitting, BP Cuff Size: Adult)   Pulse 80   Ht 1.727 m (5' 8\")   Wt 91.7 kg (202 lb 3.2 oz)   SpO2 97%   BMI 30.74 kg/m²     Physical Exam   Constitutional: He is oriented to person, place, and time. He appears well-developed and well-nourished.   HENT:   Head: Normocephalic and atraumatic.   Eyes: Pupils are equal, round, and reactive to light. EOM are normal.   Neck: Normal range of motion. Neck supple. No JVD present.   Cardiovascular: Normal rate, regular rhythm and normal heart sounds.    Pulmonary/Chest: Effort normal and breath sounds normal. No respiratory distress. He has no wheezes. He has no rales.   Abdominal: Soft. Bowel sounds are normal.   Musculoskeletal: He exhibits no edema.   Neurological: He is alert and oriented to person, place, and time.   Skin: Skin is warm and dry.   Psychiatric: He has a normal mood and affect. His behavior is normal.   Vitals reviewed.    Lab Results   Component Value Date/Time    TRIGLYCERIDE 187 (H) 09/05/2015 10:15 PM       Lab Results   Component Value Date/Time    SODIUM 137 09/16/2016 10:32 AM    POTASSIUM 3.6 09/16/2016 10:32 AM    CHLORIDE 101 09/16/2016 10:32 AM    CO2 27 09/16/2016 10:32 AM    GLUCOSE 131 (H) 09/16/2016 10:32 AM    BUN 33 (H) 09/16/2016 10:32 AM    CREATININE 1.62 (H) 09/16/2016 10:32 AM    CREATININE 0.9 02/09/2005 10:25 AM     Lab Results   Component Value Date/Time    ALKPHOSPHAT 87 12/23/2015 10:38 AM    ASTSGOT 19 12/23/2015 10:38 AM    ALTSGPT 14 12/23/2015 10:38 AM    TBILIRUBIN 0.7 12/23/2015 10:38 AM      Will request Recent [place] labs results and will scan to media.    Echocardiogram 10/5/2012  CONCLUSIONS  Left ventricular ejection fraction is 55% to 60%.  Normal left ventricular chamber size.  Normal right ventricular size and function.  Trace  mitral regurgitation.  Trace  tricuspid regurgitation.    Transthoracic Echo Report 8/30/2015  Technically difficult study.   Contrast was used to enhance visualization " of the endocardial border.  Normal left ventricular systolic function.  Left ventricular ejection fraction is 60% to 65%.  Mild mitral regurgitation.  The right ventricle was normal in size and function.  No evidence of valvular abnormality based on Doppler evaluation.     Compared to the TTE images dated 10/5/2012 - there has been no   significant change.    Transthoracic Echo Report 8/10/2016  Normal left ventricular systolic function.  Left ventricular ejection fraction is visually estimated to be 55%.  Grade I diastolic dysfunction.  Moderately dilated right ventricle.  The right ventricle was normal in function.  Mild mitral regurgitation.  Mild tricuspid regurgitation.  Right ventricular systolic pressure is estimated to be 40 mmHg.  Compared to the images of the prior study done 8/30/15-  there has been no significant change.     Left and right heart catheterization 9/16/2016  FINDINGS:  1.  HEMODYNAMICS:  Right heart pressures:  Mean right atrial pressure 4 mmHg.     Right ventricular pressure 28/4.     Pulmonary artery pressure 32/14.     Pulmonary capillary wedge pressure 9 mmHg.       Cardiac output   A. thermodilution method   Cardiac output 4.4 liters per minute.  Cardiac index 2.1 liters per minute/m2.  B. Ksenia method see below addended information.     Pulmonary vascular resistance 3.0 Wood units. Using the thermodilution method.     2. LEFT VENTRICULOGRAPHY:  Left ventricular chamber size, wall motion, and   systolic function are normal.  Calculated ejection fraction 57%.     3.  CORONARY ANGIOGRAPHY:  i.  Left main artery:  Left main vessel is large caliber, angiographically   normal, and trifurcates into the left anterior descending artery and circumflex   artery and ramus intermedius vessel.  ii.  Left anterior descending artery:  The LAD gives rise to septal branches   and extends to the apex.  The left anterior descending artery is normal.  iii.  Circumflex artery:  The circumflex consists of a  series of small   marginal branches.  Circumflex artery is angiographically normal.  iv.  Ramus intermedius:  The ramus is a long caliber vessel that is   angiographically normal.  v.  Right coronary artery:  The RCA gives rise to RV branches, posterior   descending artery, and posterolateral branch.  Angiographically, the right   coronary artery is normal.     CONCLUSION:  1. EF 57%.  2. Normal right heart pressures.  3. Normal pulmonary vascular resistance.  4. Normal coronary arteries.     ADDENDUM:    Subsequent oximetry measurements post-procedure returned.    1. Pulmonary artery oximetry 99%.  2. Ascending aortic oximetry 100%.     These findings suggest the possibility of the presence of a shunt or spurious   results.  This will need to be subsequently clarified.     Addended Ksenia cardiac output information.     The correct oximetry measurements are:  1. Aortic oximetry 97.2%.  2. Pulmonary artery oximetry 76.5%.     Careful review of the original cath lab report revealed that the pulmonary artery and ascending aortic oximetries   were inserted in the wrong places in the computer data base resulting in a miscalculation of the Ksenia cardiac output.     An addended cath lab report has been placed in the EHR are under the media tab.     The correct Ksenia cardiac output is actually recorded in the pulmonary flow Qp location.  Ksenia method cardiac output 5.57 L/m cardiac index 2.67 L/m/m². This would not alter the peripheral   vascular resistance in fact reduce the recorded number of 3 using the thermodilution method.       Transthoracic Echo Report 6/28/2019-results reviewed with patient  Compared to the images of the prior study done 08/10/2016, the RV appears normal in size.  Left ventricular ejection fraction is visually estimated to be 60%.  Unable to estimate pulmonary artery pressure due to an inadequate tricuspid regurgitant jet.    Assessment:     1. ACC/AHA stage C diastolic congestive heart failure (HCC)      2. Heart failure, NYHA class 1 ([facility])     3. SOB (shortness of breath)     4. Type 2 diabetes mellitus without complication, without long-term current use of insulin ([facility])     5. Essential hypertension     6. Mixed hyperlipidemia     7. CKD (chronic kidney disease), stage III ([facility])         Medical Decision Making:  Today's Assessment / Status / Plan:   1.  Diastolic heart failure/pulmonary hypertension due to untreated sleep apnea: Patient's echocardiogram shows normal RV size  -Patient is euvolemic  -Continue spironolactone 25 mg daily (may need to discontinue if kidney function worsens)  -Reinforced s/sx of worsening symptoms with patient and weight monitoring. Pt verbalizes understanding. Pt to call office or RTC if present.     2.  Fatigue:  -Discussed with patient, to use his full facemask for his sleep apnea for a little bit longer to see if it helps with his fatigue. Discussed with patient to follow-up with endocrinology or PCP for further evaluation or possible other causes.  Patient reports to have good work up with no definitive cause of fatigue. Discussed differential diagnosis of possibly depression, stress or anxiety, patient reports he does not feel any depression, stress or anxiety.  -Discussed with patient to check blood pressure at time of fatigue.    3.  Hypertension: Stable  -Continue spironolactone per above    4. HLD: Last  on 5/20/2019  -Continue atorvastatin 40 mg daily  -Patient to verify if he is taking fenofibrate?    5.  CKD stage III: Likely due to diabetes  -Continue to follow with PCP    FU in clinic in 2 months with Dr. Baldwin. Sooner if needed.    Patient verbalizes understanding and agrees with the plan of care.     Collaborating MD: JEN Ortiz MD

## 2022-02-16 ENCOUNTER — TELEPHONE (OUTPATIENT)
Dept: CARDIOLOGY | Facility: MEDICAL CENTER | Age: 72
End: 2022-02-16
Payer: COMMERCIAL

## 2022-02-16 ENCOUNTER — OFFICE VISIT (OUTPATIENT)
Dept: CARDIOLOGY | Facility: MEDICAL CENTER | Age: 72
End: 2022-02-16
Payer: COMMERCIAL

## 2022-02-16 ENCOUNTER — NON-PROVIDER VISIT (OUTPATIENT)
Dept: CARDIOLOGY | Facility: MEDICAL CENTER | Age: 72
End: 2022-02-16
Payer: COMMERCIAL

## 2022-02-16 VITALS
HEIGHT: 68 IN | BODY MASS INDEX: 30.01 KG/M2 | WEIGHT: 198 LBS | SYSTOLIC BLOOD PRESSURE: 128 MMHG | OXYGEN SATURATION: 99 % | HEART RATE: 88 BPM | RESPIRATION RATE: 16 BRPM | DIASTOLIC BLOOD PRESSURE: 70 MMHG

## 2022-02-16 DIAGNOSIS — R06.02 SHORTNESS OF BREATH: ICD-10-CM

## 2022-02-16 DIAGNOSIS — E87.6 HYPOKALEMIA: ICD-10-CM

## 2022-02-16 DIAGNOSIS — Z79.01 INADEQUATE ANTICOAGULATION: ICD-10-CM

## 2022-02-16 DIAGNOSIS — N17.1 ACUTE RENAL FAILURE WITH ACUTE CORTICAL NECROSIS (HCC): ICD-10-CM

## 2022-02-16 DIAGNOSIS — I48.0 PAROXYSMAL ATRIAL FIBRILLATION (HCC): ICD-10-CM

## 2022-02-16 DIAGNOSIS — E11.9 TYPE 2 DIABETES MELLITUS WITHOUT COMPLICATION, WITHOUT LONG-TERM CURRENT USE OF INSULIN (HCC): ICD-10-CM

## 2022-02-16 DIAGNOSIS — I46.9 CARDIAC ARREST (HCC): ICD-10-CM

## 2022-02-16 DIAGNOSIS — E78.2 MIXED HYPERLIPIDEMIA: ICD-10-CM

## 2022-02-16 DIAGNOSIS — Z51.81 INADEQUATE ANTICOAGULATION: ICD-10-CM

## 2022-02-16 DIAGNOSIS — I10 ESSENTIAL HYPERTENSION: ICD-10-CM

## 2022-02-16 DIAGNOSIS — E83.42 HYPOMAGNESEMIA: ICD-10-CM

## 2022-02-16 DIAGNOSIS — I50.31 ACUTE DIASTOLIC HEART FAILURE (HCC): ICD-10-CM

## 2022-02-16 PROCEDURE — 99215 OFFICE O/P EST HI 40 MIN: CPT | Performed by: INTERNAL MEDICINE

## 2022-02-16 PROCEDURE — 93000 ELECTROCARDIOGRAM COMPLETE: CPT | Performed by: INTERNAL MEDICINE

## 2022-02-16 ASSESSMENT — ENCOUNTER SYMPTOMS
CONSTITUTIONAL NEGATIVE: 1
FEVER: 0
CARDIOVASCULAR NEGATIVE: 1
BRUISES/BLEEDS EASILY: 0
DIZZINESS: 0
PND: 0
CLAUDICATION: 0
WEAKNESS: 0
GASTROINTESTINAL NEGATIVE: 1
SORE THROAT: 0
LOSS OF CONSCIOUSNESS: 0
COUGH: 0
EYES NEGATIVE: 1
SPUTUM PRODUCTION: 0
CHILLS: 0
HEMOPTYSIS: 0
STRIDOR: 0
MUSCULOSKELETAL NEGATIVE: 1
ORTHOPNEA: 0
SHORTNESS OF BREATH: 0
NEUROLOGICAL NEGATIVE: 1
PALPITATIONS: 0
WHEEZING: 0
RESPIRATORY NEGATIVE: 1

## 2022-02-16 NOTE — PROGRESS NOTES
Patient enrolled in the 30 day [facility] [employer] Heart monitoring program, per Paul Baldwin M.D.  >In clinic hook up, monitor serial #[id number].  >Baseline transmitted  >Pending EOS.

## 2022-02-16 NOTE — TELEPHONE ENCOUNTER
WILL      Pt called and stated that at his providers office, where he was going to get his Colonoscopy today, pt had an irregular heart beat and the procedure was stopped. Pt called in and wants to discuss the irregular heart beat.      Pt has an appt with RO on 03-.    Best contact for pt:  PH: [phone]      Thank you,  Marianne RUBY

## 2022-02-16 NOTE — PROGRESS NOTES
Chief Complaint   Patient presents with   • Congestive Heart Failure     F/V Dx: Acute diastolic heart failure ([facility]) ([facility])       Subjective:   Mark Lopez is a 68 y.o. male who presents today as a follow-up.  Since we last saw him he has been doing well.  He did had not a A1c checked recently that showed an A1c of 9.2 with a creatinine of 2.0.  His labs also showed polycythemia with hematocrit of 25.    Since he was last seen he went for colonoscopy today but told he was in atrial fibrillation.  Is never felt this before.  His CPAP machine is on recall and has not been able to use it since November.  His rate is controlled.  He has no chest pain or shortness of breath.  He occasionally gets occasional dizziness but has not been told is been atrial fibrillation.    Past Medical History:   Diagnosis Date   • Arthritis     all over   • Breath shortness     r/t chf   • Cancer ([facility]) 2010    thyroid cancer   • Cataract     shandra eyes, no surgery yet   • CHEST PAIN 4/23/2012   • CHF (congestive heart failure) ([facility]) 8//12/16    diastolic   • Dental disorder     upper denture   • Disorder of thyroid     had surgery, on meds   • DM (diabetes mellitus) ([facility]) 4/23/2012    oral meds only   • Heart burn    • Heart failure ([facility])    • High cholesterol    • Hyperlipidemia 4/23/2012   • Hypertension    • Indigestion    • Pain     compression fracture of L1   • Renal disorder     kidney stones   • Renal failure 2015    due to hemorrhage   • Sleep apnea    • Snoring      Past Surgical History:   Procedure Laterality Date   • CATARACT PHACO WITH IOL Left 8/15/2017    Procedure: CATARACT PHACO WITH IOL;  Surgeon: Osmel Villarreal M.D.;  Location: SURGERY SAME DAY [facility];  Service:    • INCISION AND DRAINAGE GENERAL  9/12/2015    Procedure: INCISION AND DRAINAGE GENERAL ABDOMINAL WASHOUT AND CLOSURE;  Surgeon: Mark Peters M.D.;  Location: SURGERY [facility];  Service:    • EXPLORATORY LAPAROTOMY  9/7/2015     Procedure: EXPLORATORY LAPAROTOMY;  Surgeon: Mark Peters M.D.;  Location: SURGERY [facility];  Service:    • EXPLORATORY LAPAROTOMY N/A 9/5/2015    Procedure: EXPLORATORY LAPAROTOMY;  Surgeon: Mark Peters M.D.;  Location: SURGERY [facility];  Service:    • OTHER  1974    vein stripping rt leg-- groin to ankle   • BUNIONECTOMY Left    • OTHER      Thyroid   • OTHER      Ear tumor   • OTHER ABDOMINAL SURGERY      gallbladder   • OTHER ORTHOPEDIC SURGERY      right shoulder x 2     Family History   Problem Relation Age of Onset   • Heart Attack Father    • Heart Disease Father    • Heart Disease Brother    • Heart Attack Brother      Social History     Socioeconomic History   • Marital status:      Spouse name: Not on file   • Number of children: Not on file   • Years of education: Not on file   • Highest education level: Not on file   Occupational History   • Not on file   Tobacco Use   • Smoking status: Never Smoker   • Smokeless tobacco: Never Used   Vaping Use   • Vaping Use: Never used   Substance and Sexual Activity   • Alcohol use: No   • Drug use: Yes     Types: Marijuana     Comment: medical marijuana   • Sexual activity: Not on file     Comment: Medical    Other Topics Concern   • Not on file   Social History Narrative   • Not on file     Social Determinants of Health     Financial Resource Strain: Not on file   Food Insecurity: Not on file   Transportation Needs: Not on file   Physical Activity: Not on file   Stress: Not on file   Social Connections: Not on file   Intimate Partner Violence: Not on file   Housing Stability: Not on file     Allergies   Allergen Reactions   • Codeine Vomiting and Nausea     Outpatient Encounter Medications as of 2/16/2022   Medication Sig Dispense Refill   • apixaban (ELIQUIS) 5mg Tab Take 1 Tablet by mouth 2 times a day. 60 Tablet 11   • RA COL-RITE 100 MG Cap Take 100 mg by mouth.  0   • paricalcitol (ZEMPLAR) 1 MCG capsule Take 1 mcg by  mouth every day.  0   • doxercalciferol (HECTORAL) 2.5 MCG Cap Take 2.5 mcg by mouth every day.  0   • atorvastatin (LIPITOR) 40 MG Tab Take 1 Tab by mouth every evening. 30 Tab 11   • FREESTYLE LITE strip   0   • omeprazole (PRILOSEC) 40 MG delayed-release capsule Take 40 mg by mouth every day.  0   • pioglitazone (ACTOS) 30 MG Tab Take 1 Tab by mouth every day.     • acetaZOLAMIDE (DIAMOX) 125 MG Tab Take 125 mg by mouth every day.     • spironolactone (ALDACTONE) 25 MG Tab Take 1 Tab by mouth every day.     • Testosterone Cypionate 100 MG/ML Solution 75 mg by Intramuscular route every 7 days.     • cyanocobalamin (VITAMIN B-12) 1000 MCG/ML Solution 1 mL by Intramuscular route every 7 days.     • glimepiride (AMARYL) 2 MG Tab Take 2 Tabs by mouth every morning. 60 Tab 3   • pregabalin (LYRICA) 150 MG Cap Take 300 mg by mouth 2 times a day.     • levothyroxine (SYNTHROID) 175 MCG Tab Take 175 mcg by mouth Every morning on an empty stomach.     • [DISCONTINUED] aspirin (ASA) 81 MG Chew Tab chewable tablet Take 1 Tab by mouth every day. 100 Tab 11     No facility-administered encounter medications on file as of 2/16/2022.     Review of Systems   Constitutional: Negative.  Negative for chills, fever and malaise/fatigue.   HENT: Negative.  Negative for sore throat.    Eyes: Negative.    Respiratory: Negative.  Negative for cough, hemoptysis, sputum production, shortness of breath, wheezing and stridor.    Cardiovascular: Negative.  Negative for chest pain, palpitations, orthopnea, claudication, leg swelling and PND.   Gastrointestinal: Negative.    Genitourinary: Negative.    Musculoskeletal: Negative.    Skin: Negative.    Neurological: Negative.  Negative for dizziness, loss of consciousness and weakness.   Endo/Heme/Allergies: Negative.  Does not bruise/bleed easily.   All other systems reviewed and are negative.     Objective:   /70 (BP Location: Left arm, Patient Position: Sitting, BP Cuff Size: Adult)    "Pulse 88   Resp 16   Ht 1.727 m (5' 8\")   Wt 89.8 kg (198 lb)   SpO2 99%   BMI 30.11 kg/m²     Physical Exam  Vitals and nursing note reviewed.   Constitutional:       General: He is not in acute distress.     Appearance: He is well-developed. He is not diaphoretic.   HENT:      Head: Normocephalic and atraumatic.      Right Ear: External ear normal.      Left Ear: External ear normal.      Nose: Nose normal.      Mouth/Throat:      Pharynx: No oropharyngeal exudate.   Eyes:      General: No scleral icterus.        Right eye: No discharge.         Left eye: No discharge.      Conjunctiva/sclera: Conjunctivae normal.      Pupils: Pupils are equal, round, and reactive to light.   Neck:      Vascular: No JVD.   Cardiovascular:      Rate and Rhythm: Normal rate and regular rhythm.      Heart sounds: No murmur heard.    No friction rub. No gallop.   Pulmonary:      Effort: Pulmonary effort is normal. No respiratory distress.      Breath sounds: No stridor. No wheezing or rales.   Chest:      Chest wall: No tenderness.   Abdominal:      General: There is no distension.      Palpations: Abdomen is soft.      Tenderness: There is no guarding.   Musculoskeletal:         General: No tenderness or deformity. Normal range of motion.      Cervical back: Neck supple.   Skin:     General: Skin is warm and dry.      Coloration: Skin is not pale.      Findings: No erythema or rash.   Neurological:      Mental Status: He is alert.      Cranial Nerves: No cranial nerve deficit.      Motor: No abnormal muscle tone.      Coordination: Coordination normal.      Deep Tendon Reflexes: Reflexes are normal and symmetric. Reflexes normal.   Psychiatric:         Behavior: Behavior normal.         Thought Content: Thought content normal.         Judgment: Judgment normal.         Assessment:     1. Essential hypertension  EKG   2. Acute renal failure with acute cortical necrosis (HCC)  EC-ECHOCARDIOGRAM COMPLETE W/O CONT    Cardiac Event " Monitor   3. Inadequate anticoagulation  apixaban (ELIQUIS) 5mg Tab    EC-ECHOCARDIOGRAM COMPLETE W/O CONT    Cardiac Event Monitor   4. Hypomagnesemia  apixaban (ELIQUIS) 5mg Tab    EC-ECHOCARDIOGRAM COMPLETE W/O CONT   5. Type 2 diabetes mellitus without complication, without long-term current use of insulin (HCC)  apixaban (ELIQUIS) 5mg Tab    EC-ECHOCARDIOGRAM COMPLETE W/O CONT   6. Acute diastolic heart failure (HCC)  apixaban (ELIQUIS) 5mg Tab   7. Hypokalemia  apixaban (ELIQUIS) 5mg Tab   8. Mixed hyperlipidemia     9. SOB (shortness of breath)     10. Cardiac arrest (HCC)     11. Paroxysmal atrial fibrillation (HCC)  Cardiac Event Monitor       Medical Decision Making:  Today's Assessment / Status / Plan:     71-year-old male with sleep apnea with a change in his symptoms since going back to a nasal pillow.  For his atrial fibrillation I explained to him the risk benefits and alternatives of starting oral anticoagulant.  I will have him stop his aspirin and start Eliquis 5 twice daily.  We will have him wear an order.  We will see what his burden of atrial fibrillation is.  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

## 2022-02-17 ENCOUNTER — TELEPHONE (OUTPATIENT)
Dept: CARDIOLOGY | Facility: MEDICAL CENTER | Age: 72
End: 2022-02-17
Payer: COMMERCIAL

## 2022-02-17 DIAGNOSIS — I48.0 PAROXYSMAL ATRIAL FIBRILLATION (HCC): ICD-10-CM

## 2022-02-17 DIAGNOSIS — Z51.81 INADEQUATE ANTICOAGULATION: ICD-10-CM

## 2022-02-17 DIAGNOSIS — Z79.01 INADEQUATE ANTICOAGULATION: ICD-10-CM

## 2022-02-17 DIAGNOSIS — N17.1 ACUTE RENAL FAILURE WITH ACUTE CORTICAL NECROSIS (HCC): ICD-10-CM

## 2022-02-17 LAB — EKG IMPRESSION: NORMAL

## 2022-02-17 NOTE — TELEPHONE ENCOUNTER
Aleta with [employer] called to report an urgent EKG notification.     Best Contact Number: [phone]

## 2022-02-18 PROCEDURE — 93268 ECG RECORD/REVIEW: CPT | Performed by: INTERNAL MEDICINE

## 2022-03-18 ENCOUNTER — HOSPITAL ENCOUNTER (OUTPATIENT)
Dept: CARDIOLOGY | Facility: MEDICAL CENTER | Age: 72
End: 2022-03-18
Attending: INTERNAL MEDICINE
Payer: COMMERCIAL

## 2022-03-18 DIAGNOSIS — E83.42 HYPOMAGNESEMIA: ICD-10-CM

## 2022-03-18 DIAGNOSIS — Z51.81 INADEQUATE ANTICOAGULATION: ICD-10-CM

## 2022-03-18 DIAGNOSIS — Z79.01 INADEQUATE ANTICOAGULATION: ICD-10-CM

## 2022-03-18 DIAGNOSIS — N17.1 ACUTE RENAL FAILURE WITH ACUTE CORTICAL NECROSIS (HCC): ICD-10-CM

## 2022-03-18 DIAGNOSIS — E11.9 TYPE 2 DIABETES MELLITUS WITHOUT COMPLICATION, WITHOUT LONG-TERM CURRENT USE OF INSULIN (HCC): ICD-10-CM

## 2022-03-18 PROCEDURE — 93306 TTE W/DOPPLER COMPLETE: CPT

## 2022-03-21 ENCOUNTER — TELEPHONE (OUTPATIENT)
Dept: CARDIOLOGY | Facility: MEDICAL CENTER | Age: 72
End: 2022-03-21
Payer: COMMERCIAL

## 2022-03-21 LAB
LV EJECT FRACT  99904: 60
LV EJECT FRACT MOD 2C 99903: 62.48
LV EJECT FRACT MOD 4C 99902: 60.13
LV EJECT FRACT MOD BP 99901: 60.88

## 2022-03-21 PROCEDURE — 93306 TTE W/DOPPLER COMPLETE: CPT | Mod: 26 | Performed by: INTERNAL MEDICINE

## 2022-04-01 ENCOUNTER — HOSPITAL ENCOUNTER (OUTPATIENT)
Dept: LAB | Facility: MEDICAL CENTER | Age: 72
End: 2022-04-01
Attending: INTERNAL MEDICINE
Payer: COMMERCIAL

## 2022-04-01 ENCOUNTER — OFFICE VISIT (OUTPATIENT)
Dept: CARDIOLOGY | Facility: MEDICAL CENTER | Age: 72
End: 2022-04-01
Payer: COMMERCIAL

## 2022-04-01 VITALS
WEIGHT: 201 LBS | DIASTOLIC BLOOD PRESSURE: 80 MMHG | HEIGHT: 68 IN | HEART RATE: 66 BPM | OXYGEN SATURATION: 98 % | SYSTOLIC BLOOD PRESSURE: 118 MMHG | RESPIRATION RATE: 16 BRPM | BODY MASS INDEX: 30.46 KG/M2

## 2022-04-01 DIAGNOSIS — Z79.01 INADEQUATE ANTICOAGULATION: ICD-10-CM

## 2022-04-01 DIAGNOSIS — N17.1 ACUTE RENAL FAILURE WITH ACUTE CORTICAL NECROSIS (HCC): ICD-10-CM

## 2022-04-01 DIAGNOSIS — R53.1 LEFT-SIDED WEAKNESS: ICD-10-CM

## 2022-04-01 DIAGNOSIS — I48.0 PAROXYSMAL ATRIAL FIBRILLATION (HCC): ICD-10-CM

## 2022-04-01 DIAGNOSIS — E78.2 MIXED HYPERLIPIDEMIA: ICD-10-CM

## 2022-04-01 DIAGNOSIS — I50.31 ACUTE DIASTOLIC HEART FAILURE (HCC): ICD-10-CM

## 2022-04-01 DIAGNOSIS — E87.6 HYPOKALEMIA: ICD-10-CM

## 2022-04-01 DIAGNOSIS — R06.02 SHORTNESS OF BREATH: ICD-10-CM

## 2022-04-01 DIAGNOSIS — E83.42 HYPOMAGNESEMIA: ICD-10-CM

## 2022-04-01 DIAGNOSIS — I10 ESSENTIAL HYPERTENSION: ICD-10-CM

## 2022-04-01 DIAGNOSIS — E11.9 TYPE 2 DIABETES MELLITUS WITHOUT COMPLICATION, WITHOUT LONG-TERM CURRENT USE OF INSULIN (HCC): ICD-10-CM

## 2022-04-01 DIAGNOSIS — Z51.81 INADEQUATE ANTICOAGULATION: ICD-10-CM

## 2022-04-01 LAB
ALBUMIN SERPL BCP-MCNC: 4.3 G/DL (ref 3.2–4.9)
ALBUMIN/GLOB SERPL: 2.4 G/DL
ALP SERPL-CCNC: 87 U/L (ref 30–99)
ALT SERPL-CCNC: 16 U/L (ref 2–50)
ANION GAP SERPL CALC-SCNC: 14 MMOL/L (ref 7–16)
AST SERPL-CCNC: 19 U/L (ref 12–45)
BASOPHILS # BLD AUTO: 1.1 % (ref 0–1.8)
BASOPHILS # BLD: 0.07 K/UL (ref 0–0.12)
BILIRUB SERPL-MCNC: 0.4 MG/DL (ref 0.1–1.5)
BUN SERPL-MCNC: 30 MG/DL (ref 8–22)
CALCIUM SERPL-MCNC: 9.7 MG/DL (ref 8.5–10.5)
CHLORIDE SERPL-SCNC: 104 MMOL/L (ref 96–112)
CO2 SERPL-SCNC: 21 MMOL/L (ref 20–33)
CREAT SERPL-MCNC: 2.06 MG/DL (ref 0.5–1.4)
EOSINOPHIL # BLD AUTO: 0.12 K/UL (ref 0–0.51)
EOSINOPHIL NFR BLD: 1.9 % (ref 0–6.9)
ERYTHROCYTE [DISTWIDTH] IN BLOOD BY AUTOMATED COUNT: 47.8 FL (ref 35.9–50)
GFR SERPLBLD CREATININE-BSD FMLA CKD-EPI: 34 ML/MIN/1.73 M 2
GLOBULIN SER CALC-MCNC: 1.8 G/DL (ref 1.9–3.5)
GLUCOSE SERPL-MCNC: 147 MG/DL (ref 65–99)
HCT VFR BLD AUTO: 52.2 % (ref 42–52)
HGB BLD-MCNC: 16.3 G/DL (ref 14–18)
IMM GRANULOCYTES # BLD AUTO: 0.06 K/UL (ref 0–0.11)
IMM GRANULOCYTES NFR BLD AUTO: 0.9 % (ref 0–0.9)
LYMPHOCYTES # BLD AUTO: 1.21 K/UL (ref 1–4.8)
LYMPHOCYTES NFR BLD: 19.1 % (ref 22–41)
MCH RBC QN AUTO: 25.5 PG (ref 27–33)
MCHC RBC AUTO-ENTMCNC: 31.2 G/DL (ref 33.7–35.3)
MCV RBC AUTO: 81.8 FL (ref 81.4–97.8)
MONOCYTES # BLD AUTO: 0.63 K/UL (ref 0–0.85)
MONOCYTES NFR BLD AUTO: 9.9 % (ref 0–13.4)
NEUTROPHILS # BLD AUTO: 4.26 K/UL (ref 1.82–7.42)
NEUTROPHILS NFR BLD: 67.1 % (ref 44–72)
NRBC # BLD AUTO: 0 K/UL
NRBC BLD-RTO: 0 /100 WBC
POTASSIUM SERPL-SCNC: 5.3 MMOL/L (ref 3.6–5.5)
PROT SERPL-MCNC: 6.1 G/DL (ref 6–8.2)
RBC # BLD AUTO: 6.38 M/UL (ref 4.7–6.1)
SODIUM SERPL-SCNC: 139 MMOL/L (ref 135–145)
WBC # BLD AUTO: 6.4 K/UL (ref 4.8–10.8)

## 2022-04-01 PROCEDURE — 85014 HEMATOCRIT: CPT

## 2022-04-01 PROCEDURE — 85048 AUTOMATED LEUKOCYTE COUNT: CPT

## 2022-04-01 PROCEDURE — 99215 OFFICE O/P EST HI 40 MIN: CPT | Performed by: INTERNAL MEDICINE

## 2022-04-01 PROCEDURE — 85041 AUTOMATED RBC COUNT: CPT

## 2022-04-01 PROCEDURE — 80053 COMPREHEN METABOLIC PANEL: CPT

## 2022-04-01 PROCEDURE — 36415 COLL VENOUS BLD VENIPUNCTURE: CPT

## 2022-04-01 PROCEDURE — 85018 HEMOGLOBIN: CPT

## 2022-04-01 RX ORDER — SPIRONOLACTONE 25 MG/1
25 TABLET ORAL DAILY
COMMUNITY
End: 2022-08-08

## 2022-04-01 ASSESSMENT — ENCOUNTER SYMPTOMS
WEAKNESS: 0
CONSTITUTIONAL NEGATIVE: 1
MUSCULOSKELETAL NEGATIVE: 1
SPUTUM PRODUCTION: 0
GASTROINTESTINAL NEGATIVE: 1
BRUISES/BLEEDS EASILY: 0
CHILLS: 0
RESPIRATORY NEGATIVE: 1
SHORTNESS OF BREATH: 0
ORTHOPNEA: 0
SORE THROAT: 0
LOSS OF CONSCIOUSNESS: 0
PND: 0
CLAUDICATION: 0
NEUROLOGICAL NEGATIVE: 1
DIZZINESS: 0
STRIDOR: 0
EYES NEGATIVE: 1
FEVER: 0
WHEEZING: 0
COUGH: 0
HEMOPTYSIS: 0
PALPITATIONS: 1

## 2022-04-01 NOTE — PROGRESS NOTES
Chief Complaint   Patient presents with   • Congestive Heart Failure   • Hyperlipidemia   • Atrial Fibrillation       Subjective     Mark Lopez is a 71 y.o. male who presents today as a follow-up for his chronic kidney disease and palpitations.  He had a diagnosis of heart failure though it appears to be more chronic kidney disease.  Last creatinine in her system was 2.0.  He is on spironolactone and Diamox.  He is having no chest pain.  He is having paroxysms of low blood pressure once or twice.  His Zio patch showed 95% atrial fibrillation with controlled ventricular response.    Past Medical History:   Diagnosis Date   • Arthritis     all over   • Breath shortness     r/t chf   • Cancer ([facility]) 2010    thyroid cancer   • Cataract     shandra eyes, no surgery yet   • CHEST PAIN 4/23/2012   • CHF (congestive heart failure) ([facility]) 8//12/16    diastolic   • Dental disorder     upper denture   • Disorder of thyroid     had surgery, on meds   • DM (diabetes mellitus) ([facility]) 4/23/2012    oral meds only   • Heart burn    • Heart failure ([facility])    • High cholesterol    • Hyperlipidemia 4/23/2012   • Hypertension    • Indigestion    • Pain     compression fracture of L1   • Renal disorder     kidney stones   • Renal failure 2015    due to hemorrhage   • Sleep apnea    • Snoring      Past Surgical History:   Procedure Laterality Date   • CATARACT PHACO WITH IOL Left 8/15/2017    Procedure: CATARACT PHACO WITH IOL;  Surgeon: Osmel Villarreal M.D.;  Location: SURGERY SAME DAY [facility];  Service:    • INCISION AND DRAINAGE GENERAL  9/12/2015    Procedure: INCISION AND DRAINAGE GENERAL ABDOMINAL WASHOUT AND CLOSURE;  Surgeon: Mark Peters M.D.;  Location: SURGERY [facility];  Service:    • EXPLORATORY LAPAROTOMY  9/7/2015    Procedure: EXPLORATORY LAPAROTOMY;  Surgeon: Mark Peters M.D.;  Location: SURGERY [facility];  Service:    • EXPLORATORY LAPAROTOMY N/A 9/5/2015    Procedure: EXPLORATORY  LAPAROTOMY;  Surgeon: Mark Peters M.D.;  Location: SURGERY [facility];  Service:    • OTHER  1974    vein stripping rt leg-- groin to ankle   • BUNIONECTOMY Left    • OTHER      Thyroid   • OTHER      Ear tumor   • OTHER ABDOMINAL SURGERY      gallbladder   • OTHER ORTHOPEDIC SURGERY      right shoulder x 2     Family History   Problem Relation Age of Onset   • Heart Attack Father    • Heart Disease Father    • Heart Disease Brother    • Heart Attack Brother      Social History     Socioeconomic History   • Marital status:      Spouse name: Not on file   • Number of children: Not on file   • Years of education: Not on file   • Highest education level: Not on file   Occupational History   • Not on file   Tobacco Use   • Smoking status: Never Smoker   • Smokeless tobacco: Never Used   Vaping Use   • Vaping Use: Never used   Substance and Sexual Activity   • Alcohol use: No   • Drug use: Yes     Types: Marijuana     Comment: medical marijuana   • Sexual activity: Not on file     Comment: Medical    Other Topics Concern   • Not on file   Social History Narrative   • Not on file     Social Determinants of Health     Financial Resource Strain: Not on file   Food Insecurity: Not on file   Transportation Needs: Not on file   Physical Activity: Not on file   Stress: Not on file   Social Connections: Not on file   Intimate Partner Violence: Not on file   Housing Stability: Not on file     Allergies   Allergen Reactions   • Codeine Vomiting and Nausea     Outpatient Encounter Medications as of 4/1/2022   Medication Sig Dispense Refill   • apixaban (ELIQUIS) 5mg Tab Take 1 Tablet by mouth 2 times a day. 60 Tablet 11   • spironolactone (ALDACTONE) 25 MG Tab Take 25 mg by mouth every day.     • RA COL-RITE 100 MG Cap Take 100 mg by mouth.  0   • paricalcitol (ZEMPLAR) 1 MCG capsule Take 1 mcg by mouth every day.  0   • doxercalciferol (HECTORAL) 2.5 MCG Cap Take 2.5 mcg by mouth every day.  0   • atorvastatin  "(LIPITOR) 40 MG Tab Take 1 Tab by mouth every evening. 30 Tab 11   • FREESTYLE LITE strip   0   • omeprazole (PRILOSEC) 40 MG delayed-release capsule Take 40 mg by mouth every day.  0   • pioglitazone (ACTOS) 30 MG Tab Take 1 Tab by mouth every day.     • acetaZOLAMIDE (DIAMOX) 125 MG Tab Take 125 mg by mouth every day.     • Testosterone Cypionate 100 MG/ML Solution 75 mg by Intramuscular route every 7 days.     • cyanocobalamin (VITAMIN B-12) 1000 MCG/ML Solution 1 mL by Intramuscular route every 7 days.     • glimepiride (AMARYL) 2 MG Tab Take 2 Tabs by mouth every morning. 60 Tab 3   • pregabalin (LYRICA) 150 MG Cap Take 300 mg by mouth 2 times a day.     • levothyroxine (SYNTHROID) 175 MCG Tab Take 175 mcg by mouth Every morning on an empty stomach.     • [DISCONTINUED] apixaban (ELIQUIS) 5mg Tab Take 1 Tablet by mouth 2 times a day. 60 Tablet 11   • [DISCONTINUED] spironolactone (ALDACTONE) 25 MG Tab Take 25 mg by mouth every day.       No facility-administered encounter medications on file as of 4/1/2022.     Review of Systems   Constitutional: Negative.  Negative for chills, fever and malaise/fatigue.   HENT: Negative.  Negative for sore throat.    Eyes: Negative.    Respiratory: Negative.  Negative for cough, hemoptysis, sputum production, shortness of breath, wheezing and stridor.    Cardiovascular: Positive for palpitations. Negative for chest pain, orthopnea, claudication, leg swelling and PND.   Gastrointestinal: Negative.    Genitourinary: Negative.    Musculoskeletal: Negative.    Skin: Negative.    Neurological: Negative.  Negative for dizziness, loss of consciousness and weakness.   Endo/Heme/Allergies: Negative.  Does not bruise/bleed easily.   All other systems reviewed and are negative.             Objective     /80 (BP Location: Left arm, Patient Position: Sitting, BP Cuff Size: Adult)   Pulse 66   Resp 16   Ht 1.727 m (5' 8\")   Wt 91.2 kg (201 lb)   SpO2 98%   BMI 30.56 kg/m² "     Physical Exam  Vitals and nursing note reviewed.   Constitutional:       General: He is not in acute distress.     Appearance: He is well-developed. He is not diaphoretic.   HENT:      Head: Normocephalic and atraumatic.      Right Ear: External ear normal.      Left Ear: External ear normal.      Nose: Nose normal.      Mouth/Throat:      Pharynx: No oropharyngeal exudate.   Eyes:      General: No scleral icterus.        Right eye: No discharge.         Left eye: No discharge.      Conjunctiva/sclera: Conjunctivae normal.      Pupils: Pupils are equal, round, and reactive to light.   Neck:      Vascular: No JVD.   Cardiovascular:      Rate and Rhythm: Normal rate and regular rhythm.      Heart sounds: No murmur heard.    No friction rub. No gallop.   Pulmonary:      Effort: Pulmonary effort is normal. No respiratory distress.      Breath sounds: No stridor. No wheezing or rales.   Chest:      Chest wall: No tenderness.   Abdominal:      General: There is no distension.      Palpations: Abdomen is soft.      Tenderness: There is no guarding.   Musculoskeletal:         General: No tenderness or deformity. Normal range of motion.      Cervical back: Neck supple.   Skin:     General: Skin is warm and dry.      Coloration: Skin is not pale.      Findings: No erythema or rash.   Neurological:      Mental Status: He is alert.      Cranial Nerves: No cranial nerve deficit.      Motor: No abnormal muscle tone.      Coordination: Coordination normal.      Deep Tendon Reflexes: Reflexes are normal and symmetric. Reflexes normal.   Psychiatric:         Behavior: Behavior normal.         Thought Content: Thought content normal.         Judgment: Judgment normal.                Assessment & Plan     1. Acute renal failure with acute cortical necrosis (HCC)     2. Inadequate anticoagulation  Comp Metabolic Panel    CBC W/ DIFF W/O PLATELETS    apixaban (ELIQUIS) 5mg Tab   3. Type 2 diabetes mellitus without complication,  without long-term current use of insulin (HCC)  Comp Metabolic Panel    CBC W/ DIFF W/O PLATELETS    apixaban (ELIQUIS) 5mg Tab   4. Left-sided weakness  Comp Metabolic Panel    CBC W/ DIFF W/O PLATELETS   5. Acute diastolic heart failure (HCC)  Comp Metabolic Panel    CBC W/ DIFF W/O PLATELETS    apixaban (ELIQUIS) 5mg Tab   6. Essential hypertension  Comp Metabolic Panel    CBC W/ DIFF W/O PLATELETS   7. Mixed hyperlipidemia     8. SOB (shortness of breath)     9. Paroxysmal atrial fibrillation (HCC)     10. Hypomagnesemia  apixaban (ELIQUIS) 5mg Tab   11. Hypokalemia  apixaban (ELIQUIS) 5mg Tab       Medical Decision Making: Today's Assessment/Status/Plan:        8-year-old male with a chart diagnosis of heart failure with preserved ejection fraction which is likely just manifestations of his chronic kidney disease.  This point I think his hypotension was anything more than dehydration.  A Zio patch showing almost 100% burden of atrial fibrillation with controlled rate is hard to blame this on a one-time event.  I would like to get some labs in our system.  I am concerned about him being on spironolactone given his creatinine.  I have given him a prescription for Eliquis to run through a mail order pharmacy and if does not a viable option we will send him some Xarelto.

## 2022-05-06 ENCOUNTER — OFFICE VISIT (OUTPATIENT)
Dept: CARDIOLOGY | Facility: MEDICAL CENTER | Age: 72
End: 2022-05-06
Payer: COMMERCIAL

## 2022-05-06 VITALS
WEIGHT: 203 LBS | DIASTOLIC BLOOD PRESSURE: 70 MMHG | SYSTOLIC BLOOD PRESSURE: 122 MMHG | OXYGEN SATURATION: 99 % | HEIGHT: 68 IN | BODY MASS INDEX: 30.77 KG/M2 | HEART RATE: 81 BPM | RESPIRATION RATE: 16 BRPM

## 2022-05-06 DIAGNOSIS — E83.42 HYPOMAGNESEMIA: ICD-10-CM

## 2022-05-06 DIAGNOSIS — N17.1 ACUTE RENAL FAILURE WITH ACUTE CORTICAL NECROSIS (HCC): ICD-10-CM

## 2022-05-06 DIAGNOSIS — E87.6 HYPOKALEMIA: ICD-10-CM

## 2022-05-06 DIAGNOSIS — E78.2 MIXED HYPERLIPIDEMIA: ICD-10-CM

## 2022-05-06 DIAGNOSIS — E11.9 TYPE 2 DIABETES MELLITUS WITHOUT COMPLICATION, WITHOUT LONG-TERM CURRENT USE OF INSULIN (HCC): ICD-10-CM

## 2022-05-06 DIAGNOSIS — R06.02 SHORTNESS OF BREATH: ICD-10-CM

## 2022-05-06 DIAGNOSIS — I50.31 ACUTE DIASTOLIC HEART FAILURE (HCC): ICD-10-CM

## 2022-05-06 DIAGNOSIS — I48.0 PAROXYSMAL ATRIAL FIBRILLATION (HCC): ICD-10-CM

## 2022-05-06 DIAGNOSIS — I10 ESSENTIAL HYPERTENSION: ICD-10-CM

## 2022-05-06 PROCEDURE — 99214 OFFICE O/P EST MOD 30 MIN: CPT | Performed by: INTERNAL MEDICINE

## 2022-05-06 ASSESSMENT — ENCOUNTER SYMPTOMS
GASTROINTESTINAL NEGATIVE: 1
COUGH: 0
SPUTUM PRODUCTION: 0
PND: 0
PALPITATIONS: 1
SORE THROAT: 0
WHEEZING: 0
FEVER: 0
MUSCULOSKELETAL NEGATIVE: 1
EYES NEGATIVE: 1
CLAUDICATION: 0
NEUROLOGICAL NEGATIVE: 1
LOSS OF CONSCIOUSNESS: 0
CONSTITUTIONAL NEGATIVE: 1
DIZZINESS: 0
STRIDOR: 0
ORTHOPNEA: 0
CHILLS: 0
WEAKNESS: 0
RESPIRATORY NEGATIVE: 1
SHORTNESS OF BREATH: 0
HEMOPTYSIS: 0
BRUISES/BLEEDS EASILY: 0

## 2022-05-06 NOTE — PROGRESS NOTES
Chief Complaint   Patient presents with   • Hypertension   • Hyperlipidemia   • Atrial Fibrillation       Subjective     Mark Lopez is a 71 y.o. male who presents today as a follow-up for his chronic kidney disease and palpitations.  He had a diagnosis of heart failure though it appears to be more chronic kidney disease.      Since he was last seen his creatinines been stable about 2.0.  He takes the Diamox as well as the spironolactone.  Last potassium was normal.  He is concerned about his diagnosis of heart failure.  His echocardiograms have been normal with relatively normal diastolic function.  He does have type 2 diabetes.  He is on an SGLT2 but is not on an ACE inhibitor.    Past Medical History:   Diagnosis Date   • Arthritis     all over   • Breath shortness     r/t chf   • Cancer ([facility]) 2010    thyroid cancer   • Cataract     shandra eyes, no surgery yet   • CHEST PAIN 4/23/2012   • CHF (congestive heart failure) ([facility]) 8//12/16    diastolic   • Dental disorder     upper denture   • Disorder of thyroid     had surgery, on meds   • DM (diabetes mellitus) ([facility]) 4/23/2012    oral meds only   • Heart burn    • Heart failure ([facility])    • High cholesterol    • Hyperlipidemia 4/23/2012   • Hypertension    • Indigestion    • Pain     compression fracture of L1   • Renal disorder     kidney stones   • Renal failure 2015    due to hemorrhage   • Sleep apnea    • Snoring      Past Surgical History:   Procedure Laterality Date   • CATARACT PHACO WITH IOL Left 8/15/2017    Procedure: CATARACT PHACO WITH IOL;  Surgeon: Osmel Villarreal M.D.;  Location: SURGERY SAME DAY [facility];  Service:    • INCISION AND DRAINAGE GENERAL  9/12/2015    Procedure: INCISION AND DRAINAGE GENERAL ABDOMINAL WASHOUT AND CLOSURE;  Surgeon: Mark Peters M.D.;  Location: SURGERY [facility];  Service:    • EXPLORATORY LAPAROTOMY  9/7/2015    Procedure: EXPLORATORY LAPAROTOMY;  Surgeon: Mark Peters M.D.;  Location:  SURGERY [facility];  Service:    • EXPLORATORY LAPAROTOMY N/A 9/5/2015    Procedure: EXPLORATORY LAPAROTOMY;  Surgeon: Mark Peters M.D.;  Location: SURGERY [facility];  Service:    • OTHER  1974    vein stripping rt leg-- groin to ankle   • BUNIONECTOMY Left    • OTHER      Thyroid   • OTHER      Ear tumor   • OTHER ABDOMINAL SURGERY      gallbladder   • OTHER ORTHOPEDIC SURGERY      right shoulder x 2     Family History   Problem Relation Age of Onset   • Heart Attack Father    • Heart Disease Father    • Heart Disease Brother    • Heart Attack Brother      Social History     Socioeconomic History   • Marital status:      Spouse name: Not on file   • Number of children: Not on file   • Years of education: Not on file   • Highest education level: Not on file   Occupational History   • Not on file   Tobacco Use   • Smoking status: Never Smoker   • Smokeless tobacco: Never Used   Vaping Use   • Vaping Use: Never used   Substance and Sexual Activity   • Alcohol use: No   • Drug use: Yes     Types: Marijuana     Comment: medical marijuana   • Sexual activity: Not on file     Comment: Medical    Other Topics Concern   • Not on file   Social History Narrative   • Not on file     Social Determinants of Health     Financial Resource Strain: Not on file   Food Insecurity: Not on file   Transportation Needs: Not on file   Physical Activity: Not on file   Stress: Not on file   Social Connections: Not on file   Intimate Partner Violence: Not on file   Housing Stability: Not on file     Allergies   Allergen Reactions   • Codeine Vomiting and Nausea     Outpatient Encounter Medications as of 5/6/2022   Medication Sig Dispense Refill   • apixaban (ELIQUIS) 5mg Tab Take 1 Tablet by mouth 2 times a day. 60 Tablet 11   • spironolactone (ALDACTONE) 25 MG Tab Take 25 mg by mouth every day.     • RA COL-RITE 100 MG Cap Take 100 mg by mouth.  0   • paricalcitol (ZEMPLAR) 1 MCG capsule Take 1 mcg by mouth every  "day.  0   • doxercalciferol (HECTORAL) 2.5 MCG Cap Take 2.5 mcg by mouth every day.  0   • atorvastatin (LIPITOR) 40 MG Tab Take 1 Tab by mouth every evening. 30 Tab 11   • FREESTYLE LITE strip   0   • omeprazole (PRILOSEC) 40 MG delayed-release capsule Take 40 mg by mouth every day.  0   • pioglitazone (ACTOS) 30 MG Tab Take 1 Tab by mouth every day.     • acetaZOLAMIDE (DIAMOX) 125 MG Tab Take 125 mg by mouth every day.     • Testosterone Cypionate 100 MG/ML Solution 75 mg by Intramuscular route every 7 days.     • cyanocobalamin (VITAMIN B-12) 1000 MCG/ML Solution 1 mL by Intramuscular route every 7 days.     • glimepiride (AMARYL) 2 MG Tab Take 2 Tabs by mouth every morning. 60 Tab 3   • pregabalin (LYRICA) 150 MG Cap Take 300 mg by mouth 2 times a day.     • levothyroxine (SYNTHROID) 175 MCG Tab Take 175 mcg by mouth Every morning on an empty stomach.       No facility-administered encounter medications on file as of 5/6/2022.     Review of Systems   Constitutional: Negative.  Negative for chills, fever and malaise/fatigue.   HENT: Negative.  Negative for sore throat.    Eyes: Negative.    Respiratory: Negative.  Negative for cough, hemoptysis, sputum production, shortness of breath, wheezing and stridor.    Cardiovascular: Positive for palpitations. Negative for chest pain, orthopnea, claudication, leg swelling and PND.   Gastrointestinal: Negative.    Genitourinary: Negative.    Musculoskeletal: Negative.    Skin: Negative.    Neurological: Negative.  Negative for dizziness, loss of consciousness and weakness.   Endo/Heme/Allergies: Negative.  Does not bruise/bleed easily.   All other systems reviewed and are negative.             Objective     /70 (BP Location: Left arm, Patient Position: Sitting, BP Cuff Size: Adult)   Pulse 81   Resp 16   Ht 1.727 m (5' 8\")   Wt 92.1 kg (203 lb)   SpO2 99%   BMI 30.87 kg/m²     Physical Exam  Vitals and nursing note reviewed.   Constitutional:       General: He " is not in acute distress.     Appearance: He is well-developed. He is not diaphoretic.   HENT:      Head: Normocephalic and atraumatic.      Right Ear: External ear normal.      Left Ear: External ear normal.      Nose: Nose normal.      Mouth/Throat:      Pharynx: No oropharyngeal exudate.   Eyes:      General: No scleral icterus.        Right eye: No discharge.         Left eye: No discharge.      Conjunctiva/sclera: Conjunctivae normal.      Pupils: Pupils are equal, round, and reactive to light.   Neck:      Vascular: No JVD.   Cardiovascular:      Rate and Rhythm: Normal rate and regular rhythm.      Heart sounds: No murmur heard.    No friction rub. No gallop.   Pulmonary:      Effort: Pulmonary effort is normal. No respiratory distress.      Breath sounds: No stridor. No wheezing or rales.   Chest:      Chest wall: No tenderness.   Abdominal:      General: There is no distension.      Palpations: Abdomen is soft.      Tenderness: There is no guarding.   Musculoskeletal:         General: No tenderness or deformity. Normal range of motion.      Cervical back: Neck supple.   Skin:     General: Skin is warm and dry.      Coloration: Skin is not pale.      Findings: No erythema or rash.   Neurological:      Mental Status: He is alert.      Cranial Nerves: No cranial nerve deficit.      Motor: No abnormal muscle tone.      Coordination: Coordination normal.      Deep Tendon Reflexes: Reflexes are normal and symmetric. Reflexes normal.   Psychiatric:         Behavior: Behavior normal.         Thought Content: Thought content normal.         Judgment: Judgment normal.                Assessment & Plan     1. Acute renal failure with acute cortical necrosis (HCC)     2. Hypomagnesemia     3. Type 2 diabetes mellitus without complication, without long-term current use of insulin (HCC)  Referral to Nephrology   4. Acute diastolic heart failure (HCC)  Referral to Nephrology   5. Hypokalemia     6. Essential hypertension      7. Mixed hyperlipidemia     8. SOB (shortness of breath)     9. Paroxysmal atrial fibrillation (HCC)         Medical Decision Making: Today's Assessment/Status/Plan:        71-year-old male with a chart diagnosis of heart failure with preserved ejection fraction which is likely just manifestations of his chronic kidney disease.  We discussed his echocardiograms.  He does not carry diagnosis of heart failure with preserved ejection fraction but does carry diagnosis of chronic kidney disease which is progressing.  He would benefit from a consultation with a nephrologist.  We placed a referral for this.  They can get him started on an ACE inhibitor and decide about the Diamox.  We will see him back in 6 months.

## 2022-07-07 ENCOUNTER — TELEPHONE (OUTPATIENT)
Dept: NEPHROLOGY | Facility: MEDICAL CENTER | Age: 72
End: 2022-07-07
Payer: COMMERCIAL

## 2022-07-07 DIAGNOSIS — N18.32 STAGE 3B CHRONIC KIDNEY DISEASE: ICD-10-CM

## 2022-07-07 DIAGNOSIS — N40.0 BENIGN PROSTATIC HYPERPLASIA, UNSPECIFIED WHETHER LOWER URINARY TRACT SYMPTOMS PRESENT: ICD-10-CM

## 2022-07-07 DIAGNOSIS — E11.69 TYPE 2 DIABETES MELLITUS WITH OTHER SPECIFIED COMPLICATION, WITH LONG-TERM CURRENT USE OF INSULIN (HCC): ICD-10-CM

## 2022-07-07 DIAGNOSIS — Z79.4 TYPE 2 DIABETES MELLITUS WITH OTHER SPECIFIED COMPLICATION, WITH LONG-TERM CURRENT USE OF INSULIN (HCC): ICD-10-CM

## 2022-07-15 ENCOUNTER — OFFICE VISIT (OUTPATIENT)
Dept: NEPHROLOGY | Facility: MEDICAL CENTER | Age: 72
End: 2022-07-15
Payer: COMMERCIAL

## 2022-07-15 VITALS
TEMPERATURE: 97.6 F | WEIGHT: 190 LBS | SYSTOLIC BLOOD PRESSURE: 120 MMHG | HEIGHT: 68 IN | DIASTOLIC BLOOD PRESSURE: 80 MMHG | OXYGEN SATURATION: 99 % | BODY MASS INDEX: 28.79 KG/M2 | HEART RATE: 79 BPM

## 2022-07-15 DIAGNOSIS — Z79.4 TYPE 2 DIABETES MELLITUS WITH OTHER SPECIFIED COMPLICATION, WITH LONG-TERM CURRENT USE OF INSULIN (HCC): Chronic | ICD-10-CM

## 2022-07-15 DIAGNOSIS — E11.69 TYPE 2 DIABETES MELLITUS WITH OTHER SPECIFIED COMPLICATION, WITH LONG-TERM CURRENT USE OF INSULIN (HCC): Chronic | ICD-10-CM

## 2022-07-15 DIAGNOSIS — N40.0 BENIGN PROSTATIC HYPERPLASIA, UNSPECIFIED WHETHER LOWER URINARY TRACT SYMPTOMS PRESENT: ICD-10-CM

## 2022-07-15 DIAGNOSIS — E20.9 HYPOPARATHYROIDISM, UNSPECIFIED HYPOPARATHYROIDISM TYPE (HCC): ICD-10-CM

## 2022-07-15 DIAGNOSIS — I50.32 CHRONIC DIASTOLIC HEART FAILURE (HCC): ICD-10-CM

## 2022-07-15 DIAGNOSIS — I10 ESSENTIAL HYPERTENSION: Chronic | ICD-10-CM

## 2022-07-15 DIAGNOSIS — N18.32 STAGE 3B CHRONIC KIDNEY DISEASE: ICD-10-CM

## 2022-07-15 PROBLEM — N52.9 PRIMARY ERECTILE DYSFUNCTION: Status: ACTIVE | Noted: 2022-02-08

## 2022-07-15 PROCEDURE — 99204 OFFICE O/P NEW MOD 45 MIN: CPT | Performed by: INTERNAL MEDICINE

## 2022-07-15 RX ORDER — TESTOSTERONE CYPIONATE 200 MG/ML
INJECTION, SOLUTION INTRAMUSCULAR
COMMUNITY
End: 2022-07-15

## 2022-07-15 RX ORDER — SILDENAFIL CITRATE 20 MG/1
TABLET ORAL
COMMUNITY
End: 2022-11-04

## 2022-07-15 RX ORDER — EMPAGLIFLOZIN 25 MG/1
25 TABLET, FILM COATED ORAL
COMMUNITY

## 2022-07-15 RX ORDER — TAMSULOSIN HYDROCHLORIDE 0.4 MG/1
0.4 CAPSULE ORAL
Qty: 90 CAPSULE | Refills: 3 | Status: SHIPPED | OUTPATIENT
Start: 2022-07-15

## 2022-07-15 ASSESSMENT — ENCOUNTER SYMPTOMS
FEVER: 0
ABDOMINAL PAIN: 0
SHORTNESS OF BREATH: 0

## 2022-07-15 NOTE — Clinical Note
Patti Weaver, Thanks for the referral. Who is this Dr. Ryan felix?  This patient is on a strange med regimen. I question many of the choices he's on and intend to change his medicines around over the next three months or so to try and get him on ACE-I/ARB, off the acetazolamide.  For you -- he told my MA that he's not on apixaban OR atorvastatin, so you might want to follow up with him on that. Do you need spirono? Does he have history of CHFrEF that I'm not aware of (no low EF echo's in our system) - is that why the spirono? I worry about high K when I start the ACE-I/ARB. I might switch the spirono to lasix if needed unless you insist on keeping the Spirono.  Sincerely, Fabian Greene MD Nephrology

## 2022-07-15 NOTE — PROGRESS NOTES
Chief Complaint   Patient presents with   • New Patient     Type 2 diabetes mellitus without complications     CC: my kidney labs are bad  Requesting Provider: Paul Baldwin M*    HPI:  Mark Lopez is a 72 y.o. male with a history of diabetes, hypertension, BPH, CKD 3B who presents today to establish nephrology care.     Re: DM2, diagnosed 2004. Patient hasn't had albuminuria checked. Patient has seen an eye doctor and does not have retinopathy. He says his A1c values have been 8-9.     Re: HTN, diagnosed with diastolic CHF in 2016. He thinks he was on BP meds in the 2000's. In 2015, he had low blood pressure readings, and was taken off BP meds and just put on diuretics. He's currently on spironolactone and acetazolamide, and doesn't necessarily feel diuretic effect.     Re: CKD, he had MICHAEL episode in 2015 when he had hemoperitoneum and ATN as a complication from cholecystectomy. He required dialysis for 2-3 weeks. He's had two kidney stones in his life. He takes ibuprofen daily now while putting in new floors, says only for the last week. He does complain of urinary hesitancy and urinary urgency and was told he has an enlarged prostate. He did have TURP back in 2016 or so.       Past Medical History:   Diagnosis Date   • Arthritis     all over   • Breath shortness     r/t chf   • Cancer (Formerly [facility]) 2010    thyroid cancer   • Cataract     shandra eyes, no surgery yet   • CHEST PAIN 4/23/2012   • CHF (congestive heart failure) (Formerly [facility]) 8//12/16    diastolic   • Dental disorder     upper denture   • Disorder of thyroid     had surgery, on meds   • DM (diabetes mellitus) (Formerly [facility]) 4/23/2012    oral meds only   • Heart burn    • Heart failure ([facility])    • High cholesterol    • Hyperlipidemia 4/23/2012   • Hypertension    • Indigestion    • Pain     compression fracture of L1   • Renal disorder     kidney stones   • Renal failure 2015    due to hemorrhage   • Sleep apnea    • Snoring        Past Surgical History:    Procedure Laterality Date   • CATARACT PHACO WITH IOL Left 08/15/2017    Procedure: CATARACT PHACO WITH IOL;  Surgeon: Osmel Villarreal M.D.;  Location: SURGERY SAME DAY [facility];  Service:    • TURP-VAPOR  2016   • INCISION AND DRAINAGE GENERAL  09/12/2015    Procedure: INCISION AND DRAINAGE GENERAL ABDOMINAL WASHOUT AND CLOSURE;  Surgeon: Mark Peters M.D.;  Location: SURGERY [facility];  Service:    • EXPLORATORY LAPAROTOMY  09/07/2015    Procedure: EXPLORATORY LAPAROTOMY;  Surgeon: Mark Peters M.D.;  Location: SURGERY [facility];  Service:    • EXPLORATORY LAPAROTOMY N/A 09/05/2015    Procedure: EXPLORATORY LAPAROTOMY;  Surgeon: Mark Peters M.D.;  Location: SURGERY [facility];  Service:    • PARATHYROIDECTOMY  2004   • OTHER  01/01/1974    vein stripping rt leg-- groin to ankle   • BUNIONECTOMY Left    • OTHER      Thyroid   • OTHER      Ear tumor   • OTHER ABDOMINAL SURGERY      gallbladder   • OTHER ORTHOPEDIC SURGERY      right shoulder x 2        Outpatient Encounter Medications as of 7/15/2022   Medication Sig Dispense Refill   • Empagliflozin (JARDIANCE) 25 MG Tab Take 25 mg by mouth.     • spironolactone (ALDACTONE) 25 MG Tab Take 25 mg by mouth every day.     • RA COL-RITE 100 MG Cap Take 100 mg by mouth.  0   • paricalcitol (ZEMPLAR) 1 MCG capsule Take 1 mcg by mouth every day.  0   • doxercalciferol (HECTORAL) 2.5 MCG Cap Take 2.5 mcg by mouth every day.  0   • omeprazole (PRILOSEC) 40 MG delayed-release capsule Take 40 mg by mouth every day.  0   • pioglitazone (ACTOS) 30 MG Tab Take 1 Tab by mouth every day.     • acetaZOLAMIDE (DIAMOX) 125 MG Tab Take 125 mg by mouth every day.     • Testosterone Cypionate 100 MG/ML Solution 75 mg by Intramuscular route every 7 days.     • cyanocobalamin (VITAMIN B-12) 1000 MCG/ML Solution 1 mL by Intramuscular route every 7 days.     • glimepiride (AMARYL) 2 MG Tab Take 2 Tabs by mouth every morning. 60 Tab 3   •  pregabalin (LYRICA) 150 MG Cap Take 300 mg by mouth 2 times a day.     • levothyroxine (SYNTHROID) 175 MCG Tab Take 175 mcg by mouth Every morning on an empty stomach.     • testosterone cypionate (DEPO-TESTOSTERONE) 200 MG/ML Solution injection testosterone cypionate 200 mg/mL intramuscular oil   inject 0.75 milliliters intramuscularly every week     • sildenafil (REVATIO) 20 MG tablet sildenafil (pulmonary hypertension) 20 mg tablet   Take 5 tablets as needed by oral route for 30 days.     • apixaban (ELIQUIS) 5mg Tab Take 1 Tablet by mouth 2 times a day. (Patient not taking: Reported on 7/15/2022) 60 Tablet 11   • atorvastatin (LIPITOR) 40 MG Tab Take 1 Tab by mouth every evening. (Patient not taking: Reported on 7/15/2022) 30 Tab 11   • FREESTYLE LITE strip   0     No facility-administered encounter medications on file as of 7/15/2022.        Allergies   Allergen Reactions   • Codeine Vomiting and Nausea       Social History     Socioeconomic History   • Marital status:      Spouse name: Not on file   • Number of children: Not on file   • Years of education: Not on file   • Highest education level: Not on file   Occupational History   • Not on file   Tobacco Use   • Smoking status: Never Smoker   • Smokeless tobacco: Never Used   Vaping Use   • Vaping Use: Never used   Substance and Sexual Activity   • Alcohol use: No   • Drug use: Yes     Types: Marijuana     Comment: medical marijuana   • Sexual activity: Not on file     Comment: Medical    Other Topics Concern   • Not on file   Social History Narrative   • Not on file     Social Determinants of Health     Financial Resource Strain: Not on file   Food Insecurity: Not on file   Transportation Needs: Not on file   Physical Activity: Not on file   Stress: Not on file   Social Connections: Not on file   Intimate Partner Violence: Not on file   Housing Stability: Not on file       Family History   Problem Relation Age of Onset   • Heart Attack Father    •  "Heart Disease Father    • Heart Disease Brother    • Heart Attack Brother        Review of Systems   Constitutional: Negative for fever.   Respiratory: Negative for shortness of breath.    Cardiovascular: Negative for chest pain.   Gastrointestinal: Negative for abdominal pain.   Genitourinary: Negative for dysuria.   All other systems reviewed and are negative.      /80 (BP Location: Right arm, Patient Position: Sitting, BP Cuff Size: Adult)   Pulse 79   Temp 36.4 °C (97.6 °F) (Temporal)   Ht 1.727 m (5' 8\")   Wt 86.2 kg (190 lb)   SpO2 99%   BMI 28.89 kg/m²     Physical Exam  Constitutional:       General: He is not in acute distress.  HENT:      Mouth/Throat:      Pharynx: No oropharyngeal exudate.   Eyes:      General: No scleral icterus.  Neck:      Trachea: No tracheal deviation.   Cardiovascular:      Rate and Rhythm: Normal rate and regular rhythm.      Heart sounds: Normal heart sounds. No murmur heard.  Pulmonary:      Effort: Pulmonary effort is normal.      Breath sounds: Normal breath sounds. No stridor. No rales.   Abdominal:      General: Bowel sounds are normal.      Palpations: Abdomen is soft.      Tenderness: There is no abdominal tenderness.   Musculoskeletal:         General: Normal range of motion.      Cervical back: Neck supple.      Right lower leg: No edema.      Left lower leg: No edema.   Skin:     General: Skin is warm and dry.      Findings: No rash.   Neurological:      General: No focal deficit present.      Mental Status: He is alert and oriented to person, place, and time.   Psychiatric:         Mood and Affect: Mood and affect normal.         Behavior: Behavior normal.           Labs reviewed.    Labs 6/1/2022  Hemoglobin 17.2  Platelets 241  Sodium 140  Potassium 4.5  Chloride 105  CO2 25  BUN 36  Creatinine 2.1  Estimated GFR 33  Glucose 170  Calcium 9.9  Phosphorus 4.0  Albumin 4.5  Intact PTH 38  Urine protein not quantified      Recent Labs     04/01/22  1034 "   ALBUMIN 4.3   SODIUM 139   POTASSIUM 5.3   CHLORIDE 104   CO2 21   BUN 30*   CREATININE 2.06*       Lab Results   Component Value Date/Time    WBC 6.4 04/01/2022 10:34 AM    RBC 6.38 (H) 04/01/2022 10:34 AM    HEMOGLOBIN 16.3 04/01/2022 10:34 AM    HEMATOCRIT 52.2 (H) 04/01/2022 10:34 AM    MCV 81.8 04/01/2022 10:34 AM    MCH 25.5 (L) 04/01/2022 10:34 AM    MCHC 31.2 (L) 04/01/2022 10:34 AM    MPV 10.8 09/16/2016 10:32 AM           URINALYSIS:  Lab Results   Component Value Date/Time    COLORURINE Dark-Red 09/01/2015 0400    CLARITY Cloudy (A) 09/01/2015 0400    SPECGRAVITY 1.017 09/01/2015 0400    PHURINE 6.0 09/01/2015 0400    KETONES 10 (A) 09/01/2015 0400    PROTEINURIN 100 (A) 09/01/2015 0400    BILIRUBINUR Negative 09/01/2015 0400    NITRITE Negative 09/01/2015 0400    LEUKESTERAS Large (A) 09/01/2015 0400    OCCULTBLOOD Large (A) 09/01/2015 0400     UPC  No results found for: TOTPROTUR   Lab Results   Component Value Date/Time    CREATININEU 179.10 08/30/2015 1400         Imaging reviewed  No orders to display         Assessment:  Mark Lopez is a 72 y.o. male with a history of MICHAEL episode in 2015 requiring 2 weeks of dialysis diabetes, hypertension, BPH, CKD 3B who presents today to establish nephrology care.     Plan:  1. Stage 3b chronic kidney disease (HCC)  - Underlying CKD likely from diabetic nephropathy given nearly 20 years of poorly controlled diabetes, as well as MICHAEL episode in 2015 from ATN from shock and hemoperitoneum requiring about 2 weeks of dialysis.  I recommend avoid NSAIDs and other nephrotoxins.  I recommend a low-salt diet.  I explained the importance of blood pressure and glycemic control to help slow CKD progression.  Maintain SGLT2 inhibitor for long-term kidney protection.  Will attempt to get patient on ACE inhibitor or ARB in the near future.    2. Type 2 diabetes mellitus with other specified complication, with long-term current use of insulin (HCC)  -Not very well  controlled.  Maintain SGLT2 inhibitor for long-term kidney protection.  Ultimately, patient should be on an ACE inhibitor or ARB.    3. Essential hypertension  -Controlled.  It is unclear to me why patient is on acetazolamide as a diuretic, and not on an ACE inhibitor.  I recommend stepwise correction of his antihypertensive regimen.  Discontinue acetazolamide.  Repeat labs in 1 to 2 weeks.  If labs are stable, will recommend starting lisinopril 10 mg daily.  Patient is currently on spironolactone, which is fine from a kidney standpoint, but there is a risk of hyperkalemia combined with ACE inhibitor.  If follow-up labs show elevated potassium, I would recommend discontinue spironolactone.  I recommend the patient keep a blood pressure log at home, checking blood pressure at least 3 times a week.    4. Benign prostatic hyperplasia, unspecified whether lower urinary tract symptoms present  -Patient has a history of TURP, but is having lower urinary tract symptoms.  Start tamsulosin 0.4 mg p.o. daily.    5. Chronic diastolic heart failure (HCC)  -Patient appears euvolemic on my exam.  Recommend discontinuing acetazolamide as above.  If patient does need a diuretic, I would recommend adding furosemide on its own, or switching spironolactone for furosemide if high potassium becomes an issue    6.  History of presumed hypoparathyroidism  -It is very unclear to me why patient is on 2 different vitamin D receptor agonists, with a normal PTH level.  I recommend discontinue doxercalciferol and paricalcitol.  Recommend repeat chemistry panel and PTH 1 to 2 weeks after stopping these vitamin D receptor agonists.  Current guidelines do not suggest treating predialysis patients with secondary renal hyperparathyroidism with vitamin D receptor agonist anyway.  Nutritional vitamin D deficiency should be diagnosed and treated with nutritional vitamin D such as cholecalciferol or ergocalciferol.      Return to clinic 3 months with  pre-clinic labs    Fabian Greene MD  Nephrology

## 2022-07-15 NOTE — PATIENT INSTRUCTIONS
Tylenol (generic name: acetaminophen) is SAFE for the kidneys. Maximum dose of tylenol is 3000mg a day. Aspirin also appears to be safe for the kidneys. Please avoid other NSAIDs (Non-Steroid Anti-Inflammatory Drugs), such as ibuprofen (brand names: Motrin, Advil), naproxen (brand name: Aleve), celecoxib (brand name: Celebrex), or even prescription NSAIDs such as meloxicam (brand name: Mobic), diclofenac (brand name: Voltaren), or indomethacin (brand name: Indocin), as they can be bad for your kidneys.           START taking Tamsulosin daily.    STOP taking acetazolamide, doxercalciferol, and paritcalcitol.   Check Labs 1-2 weeks afterward.   IF labs are stable, I will start you on lisinopril.     Check BP three times a week and keep a log of your blood pressures.

## 2022-08-08 DIAGNOSIS — I10 ESSENTIAL HYPERTENSION: ICD-10-CM

## 2022-08-08 RX ORDER — LISINOPRIL 10 MG/1
10 TABLET ORAL DAILY
Qty: 90 TABLET | Refills: 3 | Status: SHIPPED | OUTPATIENT
Start: 2022-08-08 | End: 2023-07-14 | Stop reason: SDUPTHER

## 2022-08-08 NOTE — PROGRESS NOTES
Repeat labs noted, Potassium 5.2 (high end of normal), Creatinine 1.6 (slightly improved). CO2 normal.   Intact PTH 54.   Calcium, phosphorus normal.     Discontinue spironolactone.   Start lisinopril 10mg daily.   Continue to NOT take Zemplar or Hectorol.     Repeat labs again in 1-2 weeks.     Fabian Greene MD  Nephrology

## 2022-08-23 ENCOUNTER — TELEPHONE (OUTPATIENT)
Dept: NEPHROLOGY | Facility: MEDICAL CENTER | Age: 72
End: 2022-08-23
Payer: COMMERCIAL

## 2022-08-24 NOTE — TELEPHONE ENCOUNTER
Outside labs reviewed    Labs 8/17/2022  Sodium 141  Potassium 4.8  Chloride 105  CO2 26  BUN 28  Creatinine 1.6  Estimated GFR 46  Glucose 158  Calcium 9.5  Phosphorus 3.3  Albumin 4.2  Intact PTH 38    Kidney function stable, potassium stable on lisinopril.  Continue lisinopril 10 mg p.o. daily.  As calcium and phosphorus are normal, and PTH is normal, continue to avoid vitamin D receptor agonist-Zemplar and Hectorol.    Next set of labs as previously scheduled prior to appointment in October.    Fabian Greene MD  Nephrology

## 2022-11-04 ENCOUNTER — OFFICE VISIT (OUTPATIENT)
Dept: NEPHROLOGY | Facility: MEDICAL CENTER | Age: 72
End: 2022-11-04
Payer: COMMERCIAL

## 2022-11-04 ENCOUNTER — TELEPHONE (OUTPATIENT)
Dept: CARDIOLOGY | Facility: MEDICAL CENTER | Age: 72
End: 2022-11-04

## 2022-11-04 VITALS
HEIGHT: 69 IN | SYSTOLIC BLOOD PRESSURE: 100 MMHG | OXYGEN SATURATION: 98 % | WEIGHT: 196 LBS | DIASTOLIC BLOOD PRESSURE: 68 MMHG | BODY MASS INDEX: 29.03 KG/M2 | TEMPERATURE: 97.8 F | HEART RATE: 72 BPM

## 2022-11-04 DIAGNOSIS — Z86.39 HISTORY OF VITAMIN D DEFICIENCY: ICD-10-CM

## 2022-11-04 DIAGNOSIS — I50.32 CHRONIC DIASTOLIC HEART FAILURE (HCC): ICD-10-CM

## 2022-11-04 DIAGNOSIS — E11.69 TYPE 2 DIABETES MELLITUS WITH OTHER SPECIFIED COMPLICATION, WITH LONG-TERM CURRENT USE OF INSULIN (HCC): Chronic | ICD-10-CM

## 2022-11-04 DIAGNOSIS — I46.9 CARDIAC ARREST (HCC): ICD-10-CM

## 2022-11-04 DIAGNOSIS — E11.9 TYPE 2 DIABETES MELLITUS WITHOUT COMPLICATION, WITHOUT LONG-TERM CURRENT USE OF INSULIN (HCC): ICD-10-CM

## 2022-11-04 DIAGNOSIS — I10 ESSENTIAL HYPERTENSION: Chronic | ICD-10-CM

## 2022-11-04 DIAGNOSIS — N18.32 STAGE 3B CHRONIC KIDNEY DISEASE: ICD-10-CM

## 2022-11-04 DIAGNOSIS — Z79.4 TYPE 2 DIABETES MELLITUS WITH OTHER SPECIFIED COMPLICATION, WITH LONG-TERM CURRENT USE OF INSULIN (HCC): Chronic | ICD-10-CM

## 2022-11-04 DIAGNOSIS — N40.0 BENIGN PROSTATIC HYPERPLASIA, UNSPECIFIED WHETHER LOWER URINARY TRACT SYMPTOMS PRESENT: ICD-10-CM

## 2022-11-04 PROCEDURE — 99214 OFFICE O/P EST MOD 30 MIN: CPT | Performed by: INTERNAL MEDICINE

## 2022-11-04 RX ORDER — SPIRONOLACTONE 25 MG/1
TABLET ORAL
COMMUNITY
Start: 2022-11-01 | End: 2022-11-04

## 2022-11-04 RX ORDER — CYCLOBENZAPRINE HCL 10 MG
TABLET ORAL
COMMUNITY
Start: 2022-09-14 | End: 2022-11-04

## 2022-11-04 RX ORDER — PREGABALIN 150 MG/1
CAPSULE ORAL
COMMUNITY
Start: 2022-10-18 | End: 2022-11-04

## 2022-11-04 RX ORDER — FENOFIBRATE 145 MG/1
TABLET, COATED ORAL
COMMUNITY

## 2022-11-04 RX ORDER — OMEPRAZOLE 40 MG/1
1 CAPSULE, DELAYED RELEASE ORAL
COMMUNITY

## 2022-11-04 RX ORDER — ATORVASTATIN CALCIUM 40 MG/1
40 TABLET, FILM COATED ORAL EVERY EVENING
Qty: 90 TABLET | Refills: 0 | Status: SHIPPED | OUTPATIENT
Start: 2022-11-04 | End: 2023-02-03 | Stop reason: SDUPTHER

## 2022-11-04 RX ORDER — CYCLOBENZAPRINE HCL 10 MG
TABLET ORAL
COMMUNITY
Start: 2022-09-14 | End: 2023-07-14

## 2022-11-04 RX ORDER — FENOFIBRATE 145 MG/1
145 TABLET, COATED ORAL DAILY
COMMUNITY
Start: 2022-09-13 | End: 2022-11-04

## 2022-11-04 RX ORDER — FUROSEMIDE 20 MG/1
20 TABLET ORAL EVERY MORNING
COMMUNITY
Start: 2022-09-06 | End: 2022-11-04

## 2022-11-04 RX ORDER — ARMODAFINIL 250 MG/1
TABLET ORAL
COMMUNITY
Start: 2022-10-25 | End: 2022-11-04

## 2022-11-04 RX ORDER — LISINOPRIL 10 MG/1
TABLET ORAL
COMMUNITY
End: 2022-11-04

## 2022-11-04 RX ORDER — LEVOTHYROXINE SODIUM 175 UG/1
1 TABLET ORAL
COMMUNITY
End: 2022-11-04

## 2022-11-04 RX ORDER — FUROSEMIDE 20 MG/1
TABLET ORAL
COMMUNITY
End: 2022-11-04

## 2022-11-04 ASSESSMENT — ENCOUNTER SYMPTOMS
SHORTNESS OF BREATH: 0
FEVER: 0
ABDOMINAL PAIN: 0

## 2022-11-04 NOTE — PROGRESS NOTES
Chief Complaint   Patient presents with    Follow-Up    Chronic Kidney Disease     CC: f/u CKD    HPI:  Mark Lopez is a 72 y.o. male with a history of MICHAEL episode in 2015 requiring 2 weeks of dialysis, diabetes, hypertension, BPH, CKD 3B who presents today for follow-up.     Re: DM2, diagnosed 2004. Patient hasn't had albuminuria checked. Patient has seen an eye doctor and does not have retinopathy. He says his A1c values have been 8-9. He says his sugars are mildly high.     Re: HTN, diagnosed with diastolic CHF in 2016. He thinks he was on BP meds in the 2000's. In 2015, he had low blood pressure readings, and was taken off BP meds and just put on diuretics. His BP at home is around 120/70. He is on lisinopril 10mg daily.     Re: CKD, he had MICHAEL episode in 2015 when he had hemoperitoneum and ATN as a complication from cholecystectomy. He required dialysis for 2-3 weeks. He's had two kidney stones in his life. He is not taking NSAIDs. Denies bladder issues on tamsulosin. He did have TURP back in 2016 or so.       Past Medical History:   Diagnosis Date    Arthritis     all over    Breath shortness     r/t chf    Cancer ([facility]) 2010    thyroid cancer    Cataract     shandra eyes, no surgery yet    CHEST PAIN 4/23/2012    CHF (congestive heart failure) ([facility]) 8//12/16    diastolic    Dental disorder     upper denture    Disorder of thyroid     had surgery, on meds    DM (diabetes mellitus) ([facility]) 4/23/2012    oral meds only    Heart burn     Heart failure ([facility])     High cholesterol     Hyperlipidemia 4/23/2012    Hypertension     Indigestion     Pain     compression fracture of L1    Renal disorder     kidney stones    Renal failure 2015    due to hemorrhage    Sleep apnea     Snoring        Past Surgical History:   Procedure Laterality Date    CATARACT PHACO WITH IOL Left 08/15/2017    Procedure: CATARACT PHACO WITH IOL;  Surgeon: Osmel Villarreal M.D.;  Location: SURGERY SAME DAY [facility];  Service:      TURP-VAPOR  2016    INCISION AND DRAINAGE GENERAL  09/12/2015    Procedure: INCISION AND DRAINAGE GENERAL ABDOMINAL WASHOUT AND CLOSURE;  Surgeon: Mark Peters M.D.;  Location: SURGERY [facility];  Service:     EXPLORATORY LAPAROTOMY  09/07/2015    Procedure: EXPLORATORY LAPAROTOMY;  Surgeon: Mark Peters M.D.;  Location: SURGERY [facility];  Service:     EXPLORATORY LAPAROTOMY N/A 09/05/2015    Procedure: EXPLORATORY LAPAROTOMY;  Surgeon: Mark Peters M.D.;  Location: SURGERY [facility];  Service:     PARATHYROIDECTOMY  2004    OTHER  01/01/1974    vein stripping rt leg-- groin to ankle    BUNIONECTOMY Left     OTHER      Thyroid    OTHER      Ear tumor    OTHER ABDOMINAL SURGERY      gallbladder    OTHER ORTHOPEDIC SURGERY      right shoulder x 2        Outpatient Encounter Medications as of 11/4/2022   Medication Sig Dispense Refill    cyclobenzaprine (FLEXERIL) 10 mg Tab 1 tablet at bedtime as needed      fenofibrate (TRICOR) 145 MG Tab fenofibrate nanocrystallized 145 mg tablet   take 1 tablet by mouth once daily      omeprazole (PRILOSEC) 40 MG delayed-release capsule Take 1 Capsule by mouth every morning before breakfast.      atorvastatin (LIPITOR) 40 MG Tab Take 1 Tablet by mouth every evening. 90 Tablet 0    lisinopril (PRINIVIL) 10 MG Tab Take 1 Tablet by mouth every day. 90 Tablet 3    Empagliflozin (JARDIANCE) 25 MG Tab Take 25 mg by mouth.      tamsulosin (FLOMAX) 0.4 MG capsule Take 1 Capsule by mouth 1/2 hour after breakfast. 90 Capsule 3    apixaban (ELIQUIS) 5mg Tab Take 1 Tablet by mouth 2 times a day. 60 Tablet 11    pioglitazone (ACTOS) 30 MG Tab Take 1 Tab by mouth every day.      cyanocobalamin (VITAMIN B-12) 1000 MCG/ML Solution 1 mL by Intramuscular route every 7 days.      glimepiride (AMARYL) 2 MG Tab Take 2 Tabs by mouth every morning. 60 Tab 3    pregabalin (LYRICA) 150 MG Cap Take 300 mg by mouth 2 times a day.      levothyroxine (SYNTHROID) 175  MCG Tab Take 175 mcg by mouth Every morning on an empty stomach.      [DISCONTINUED] Armodafinil 250 MG Tab  (Patient not taking: Reported on 11/4/2022)      [DISCONTINUED] cyclobenzaprine (FLEXERIL) 10 mg Tab take 1 tablet by mouth at bedtime if needed      [DISCONTINUED] fenofibrate (TRICOR) 145 MG Tab Take 145 mg by mouth every day.      [DISCONTINUED] furosemide (LASIX) 20 MG Tab furosemide 20 mg tablet   take 1 tablet by mouth every morning (Patient not taking: Reported on 11/4/2022)      [DISCONTINUED] furosemide (LASIX) 20 MG Tab Take 20 mg by mouth every morning.      [DISCONTINUED] levothyroxine (SYNTHROID) 175 MCG Tab Take 1 Tablet by mouth every morning on an empty stomach.      [DISCONTINUED] lisinopril (PRINIVIL) 10 MG Tab 1 tablet      [DISCONTINUED] spironolactone (ALDACTONE) 25 MG Tab  (Patient not taking: Reported on 11/4/2022)      [DISCONTINUED] pregabalin (LYRICA) 150 MG Cap 1 capsule      [DISCONTINUED] sildenafil (REVATIO) 20 MG tablet sildenafil (pulmonary hypertension) 20 mg tablet   Take 5 tablets as needed by oral route for 30 days. (Patient not taking: Reported on 11/4/2022)      [DISCONTINUED] RA COL-RITE 100 MG Cap Take 100 mg by mouth. (Patient not taking: Reported on 11/4/2022)  0    [DISCONTINUED] atorvastatin (LIPITOR) 40 MG Tab Take 1 Tab by mouth every evening. (Patient not taking: No sig reported) 30 Tab 11    FREESTYLE LITE strip   0    [DISCONTINUED] omeprazole (PRILOSEC) 40 MG delayed-release capsule Take 40 mg by mouth every day.  0    [DISCONTINUED] Testosterone Cypionate 100 MG/ML Solution 75 mg by Intramuscular route every 7 days. (Patient not taking: Reported on 11/4/2022)       No facility-administered encounter medications on file as of 11/4/2022.        Allergies   Allergen Reactions    Codeine Vomiting and Nausea             Review of Systems   Constitutional:  Negative for fever.   Respiratory:  Negative for shortness of breath.    Cardiovascular:  Negative for chest  "pain.   Gastrointestinal:  Negative for abdominal pain.   Genitourinary:  Negative for dysuria.   All other systems reviewed and are negative.    /68 (BP Location: Right arm, Patient Position: Sitting, BP Cuff Size: Adult)   Pulse 72   Temp 36.6 °C (97.8 °F) (Temporal)   Ht 1.74 m (5' 8.5\")   Wt 88.9 kg (196 lb)   SpO2 98%   BMI 29.37 kg/m²     Physical Exam  Constitutional:       General: He is not in acute distress.  HENT:      Mouth/Throat:      Pharynx: No oropharyngeal exudate.   Eyes:      General: No scleral icterus.  Neck:      Trachea: No tracheal deviation.   Cardiovascular:      Rate and Rhythm: Normal rate. Rhythm irregular.      Heart sounds: Normal heart sounds. No murmur heard.  Pulmonary:      Effort: Pulmonary effort is normal.      Breath sounds: Normal breath sounds. No stridor. No rales.   Abdominal:      General: Bowel sounds are normal.      Palpations: Abdomen is soft.      Tenderness: There is no abdominal tenderness.   Musculoskeletal:         General: Normal range of motion.      Cervical back: Neck supple.      Right lower leg: No edema.      Left lower leg: No edema.   Skin:     General: Skin is warm and dry.      Findings: No rash.   Neurological:      General: No focal deficit present.      Mental Status: He is alert and oriented to person, place, and time.   Psychiatric:         Mood and Affect: Mood and affect normal.         Behavior: Behavior normal.         Labs reviewed.    Labs 10/20/2022  Hemoglobin 16.9  Hemoglobin A1c 8.5%  Sodium 140  Potassium 5.5  Chloride 103  CO2 26  BUN 29  Creatinine 1.6  Estimated GFR 46  Glucose 116  Calcium 9.6  Albumin 4.2  Phosphorus 4.3  Intact PTH 47  Vitamin D level 30.1  Urinalysis with 500 glucose, negative ketone, negative protein, negative blood, negative leukocyte esterase  Urine albumin creatinine ratio 20 mg/g  Urine protein creatinine ratio 133 mg/g    Labs 8/17/2022  Sodium 141  Potassium 4.8  Chloride 105  CO2 26  BUN " 28  Creatinine 1.6  Estimated GFR 46  Glucose 158  Calcium 9.5  Phosphorus 3.3  Albumin 4.2  Intact PTH 38    Labs 6/1/2022  Hemoglobin 17.2  Platelets 241  Sodium 140  Potassium 4.5  Chloride 105  CO2 25  BUN 36  Creatinine 2.1  Estimated GFR 33  Glucose 170  Calcium 9.9  Phosphorus 4.0  Albumin 4.5  Intact PTH 38  Urine protein not quantified      Recent Labs     04/01/22  1034   ALBUMIN 4.3   SODIUM 139   POTASSIUM 5.3   CHLORIDE 104   CO2 21   BUN 30*   CREATININE 2.06*       Lab Results   Component Value Date/Time    WBC 6.4 04/01/2022 10:34 AM    RBC 6.38 (H) 04/01/2022 10:34 AM    HEMOGLOBIN 16.3 04/01/2022 10:34 AM    HEMATOCRIT 52.2 (H) 04/01/2022 10:34 AM    MCV 81.8 04/01/2022 10:34 AM    MCH 25.5 (L) 04/01/2022 10:34 AM    MCHC 31.2 (L) 04/01/2022 10:34 AM    MPV 10.8 09/16/2016 10:32 AM             URINALYSIS:  Lab Results   Component Value Date/Time    COLORURINE Dark-Red 09/01/2015 0400    CLARITY Cloudy (A) 09/01/2015 0400    SPECGRAVITY 1.017 09/01/2015 0400    PHURINE 6.0 09/01/2015 0400    KETONES 10 (A) 09/01/2015 0400    PROTEINURIN 100 (A) 09/01/2015 0400    BILIRUBINUR Negative 09/01/2015 0400    NITRITE Negative 09/01/2015 0400    LEUKESTERAS Large (A) 09/01/2015 0400    OCCULTBLOOD Large (A) 09/01/2015 0400       UPC  No results found for: TOTPROTUR   Lab Results   Component Value Date/Time    CREATININEU 179.10 08/30/2015 1400         Imaging reviewed  No orders to display         Assessment:  Mark Lopez is a 72 y.o. male with a history of MICHAEL episode in 2015 requiring 2 weeks of dialysis, diabetes, hypertension, BPH, CKD 3B who presents today for follow-up.     Plan:  1. Stage 3a chronic kidney disease (HCC)  - Underlying CKD likely from diabetic nephropathy given nearly 20 years of poorly controlled diabetes, as well as MICHAEL episode in 2015 from ATN from shock and hemoperitoneum requiring about 2 weeks of dialysis.  I recommend avoid NSAIDs and other nephrotoxins.  I recommend a  low-salt diet.  I explained the importance of glycemic and blood pressure control to help slow CKD progression.  Maintain SGLT2 inhibitor and ACE inhibitor for long-term kidney protection.  With the lack of albuminuria, patient is at low risk of CKD progression to ESRD.    2. Type 2 diabetes mellitus with other specified complication, with long-term current use of insulin (HCC)  -Mildly uncontrolled.  Maintain SGLT2 inhibitor for long-term kidney protection.  Maintain ACE inhibitor.  Defer further diabetes management to primary care.    3. Essential hypertension  -Well-controlled.  Maintain lisinopril 10 mg p.o. daily, as patient has mildly low blood pressure.      4. Benign prostatic hyperplasia, unspecified whether lower urinary tract symptoms present  -Patient has a history of TURP, but symptoms are controlled on tamsulosin 0.4 mg p.o. daily.      5. Chronic diastolic heart failure (HCC)  -Patient appears euvolemic on my exam.  Continue lisinopril alone at this time.  If he does need diuretic, I would recommend adding furosemide given his borderline high potassium.  Recommend discussing with cardiology if patient needs to take long-term statin.      6.  History of presumed hypoparathyroidism  -It is unclear to me why patient was previously prescribed vitamin D receptor agonist in the past.  I stopped his paricalcitol and doxercalciferol in summer 2022.  His PTH is in the normal range, and controlled off of vitamin D receptor agonist.  His vitamin D level is on the low end of normal.  I recommend nutritional supplemental vitamin D 2000 units daily or 5000 units thrice weekly.      Return to clinic 12 months with pre-clinic labs    Fabian Greene MD  Nephrology

## 2022-11-04 NOTE — Clinical Note
Hi there. Mr. Lopze was not taking his statin. I think it fell off his list. I ordered a 90-day supply. But if you think he should be on this chronically, he would appreciate it if y'all order a full year supply. Sincerely, Fabian Greene MD Nephrology [facility]

## 2022-11-04 NOTE — TELEPHONE ENCOUNTER
Received walk-in form from pt, information provided by Helen FUNK. She said pt saw his nephrologist this morning and was advised to confirm with cardiology if he should be taking atorvastatin. Noted Dr. Greene's OV notes from today recommending to discuss with cardiologist about long-term use of statin.    To Dr. Baldwin - please review and advise. Thank you!

## 2022-11-11 NOTE — TELEPHONE ENCOUNTER
Paul Baldwin M.D.  You 1 hour ago (3:09 PM)     Patient is a type II diabetic and is recommended for primary prevention of CAD.      [employer] message sent with above recommendations.

## 2023-02-03 DIAGNOSIS — I46.9 CARDIAC ARREST (HCC): ICD-10-CM

## 2023-02-03 DIAGNOSIS — E11.9 TYPE 2 DIABETES MELLITUS WITHOUT COMPLICATION, WITHOUT LONG-TERM CURRENT USE OF INSULIN (HCC): ICD-10-CM

## 2023-02-03 RX ORDER — ATORVASTATIN CALCIUM 40 MG/1
40 TABLET, FILM COATED ORAL EVERY EVENING
Qty: 90 TABLET | Refills: 0 | Status: SHIPPED | OUTPATIENT
Start: 2023-02-03 | End: 2023-05-02 | Stop reason: SDUPTHER

## 2023-02-03 NOTE — TELEPHONE ENCOUNTER
Received request via: Patient    Was the patient seen in the last year in this department? Yes    Does the patient have an active prescription (recently filled or refills available) for medication(s) requested? No    Does the patient have [facility] Plus and need 100 day supply (blood pressure, diabetes and cholesterol meds only)? Patient does not have SCP

## 2023-04-05 DIAGNOSIS — E83.42 HYPOMAGNESEMIA: ICD-10-CM

## 2023-04-05 DIAGNOSIS — Z79.01 INADEQUATE ANTICOAGULATION: ICD-10-CM

## 2023-04-05 DIAGNOSIS — E11.9 TYPE 2 DIABETES MELLITUS WITHOUT COMPLICATION, WITHOUT LONG-TERM CURRENT USE OF INSULIN (HCC): ICD-10-CM

## 2023-04-05 DIAGNOSIS — E87.6 HYPOKALEMIA: ICD-10-CM

## 2023-04-05 DIAGNOSIS — Z51.81 INADEQUATE ANTICOAGULATION: ICD-10-CM

## 2023-04-05 DIAGNOSIS — I50.31 ACUTE DIASTOLIC HEART FAILURE (HCC): ICD-10-CM

## 2023-04-06 NOTE — TELEPHONE ENCOUNTER
90 day courtesy supply provided. Msg to schedulers    Please contact patient to schedule f/u with HF provider.

## 2023-05-02 DIAGNOSIS — E11.9 TYPE 2 DIABETES MELLITUS WITHOUT COMPLICATION, WITHOUT LONG-TERM CURRENT USE OF INSULIN (HCC): ICD-10-CM

## 2023-05-02 DIAGNOSIS — I46.9 CARDIAC ARREST (HCC): ICD-10-CM

## 2023-05-02 RX ORDER — ATORVASTATIN CALCIUM 40 MG/1
40 TABLET, FILM COATED ORAL EVERY EVENING
Qty: 90 TABLET | Refills: 0 | Status: SHIPPED | OUTPATIENT
Start: 2023-05-02 | End: 2023-07-14 | Stop reason: SDUPTHER

## 2023-05-02 NOTE — TELEPHONE ENCOUNTER
Received request via: Pharmacy    Was the patient seen in the last year in this department? Yes    Does the patient have an active prescription (recently filled or refills available) for medication(s) requested? No    Does the patient have [facility] Plus and need 100 day supply (blood pressure, diabetes and cholesterol meds only)? Patient does not have SCP

## 2023-05-15 ENCOUNTER — APPOINTMENT (OUTPATIENT)
Dept: CARDIOLOGY | Facility: MEDICAL CENTER | Age: 73
End: 2023-05-15
Payer: COMMERCIAL

## 2023-07-01 DIAGNOSIS — E83.42 HYPOMAGNESEMIA: ICD-10-CM

## 2023-07-01 DIAGNOSIS — Z79.01 INADEQUATE ANTICOAGULATION: ICD-10-CM

## 2023-07-01 DIAGNOSIS — E87.6 HYPOKALEMIA: ICD-10-CM

## 2023-07-01 DIAGNOSIS — I50.31 ACUTE DIASTOLIC HEART FAILURE (HCC): ICD-10-CM

## 2023-07-01 DIAGNOSIS — Z51.81 INADEQUATE ANTICOAGULATION: ICD-10-CM

## 2023-07-01 DIAGNOSIS — E11.9 TYPE 2 DIABETES MELLITUS WITHOUT COMPLICATION, WITHOUT LONG-TERM CURRENT USE OF INSULIN (HCC): ICD-10-CM

## 2023-07-03 NOTE — TELEPHONE ENCOUNTER
Pt last follow up appt 5/2023 needed to be canceled due to pt testing positive for covid.     Final courtesy supply until patient is seen.     Msg to schedulers    Filled under VR as ADD in RO absence. Mail order requires 90 day supply

## 2023-07-14 ENCOUNTER — OFFICE VISIT (OUTPATIENT)
Dept: CARDIOLOGY | Facility: MEDICAL CENTER | Age: 73
End: 2023-07-14
Attending: NURSE PRACTITIONER
Payer: COMMERCIAL

## 2023-07-14 VITALS
HEIGHT: 69 IN | OXYGEN SATURATION: 98 % | RESPIRATION RATE: 16 BRPM | BODY MASS INDEX: 29.92 KG/M2 | WEIGHT: 202 LBS | HEART RATE: 85 BPM | DIASTOLIC BLOOD PRESSURE: 60 MMHG | SYSTOLIC BLOOD PRESSURE: 104 MMHG

## 2023-07-14 DIAGNOSIS — E83.42 HYPOMAGNESEMIA: ICD-10-CM

## 2023-07-14 DIAGNOSIS — I46.9 CARDIAC ARREST (HCC): ICD-10-CM

## 2023-07-14 DIAGNOSIS — E11.9 TYPE 2 DIABETES MELLITUS WITHOUT COMPLICATION, WITHOUT LONG-TERM CURRENT USE OF INSULIN (HCC): ICD-10-CM

## 2023-07-14 DIAGNOSIS — Z79.01 INADEQUATE ANTICOAGULATION: ICD-10-CM

## 2023-07-14 DIAGNOSIS — Z51.81 INADEQUATE ANTICOAGULATION: ICD-10-CM

## 2023-07-14 DIAGNOSIS — I50.31 ACUTE DIASTOLIC HEART FAILURE (HCC): ICD-10-CM

## 2023-07-14 DIAGNOSIS — I25.5 ACC/AHA STAGE B SYSTOLIC HEART FAILURE DUE TO ISCHEMIC CARDIOMYOPATHY (HCC): ICD-10-CM

## 2023-07-14 DIAGNOSIS — I10 ESSENTIAL HYPERTENSION: ICD-10-CM

## 2023-07-14 DIAGNOSIS — E87.6 HYPOKALEMIA: ICD-10-CM

## 2023-07-14 DIAGNOSIS — I50.20 ACC/AHA STAGE B SYSTOLIC HEART FAILURE DUE TO ISCHEMIC CARDIOMYOPATHY (HCC): ICD-10-CM

## 2023-07-14 PROCEDURE — 99214 OFFICE O/P EST MOD 30 MIN: CPT | Performed by: NURSE PRACTITIONER

## 2023-07-14 PROCEDURE — 3078F DIAST BP <80 MM HG: CPT | Performed by: NURSE PRACTITIONER

## 2023-07-14 PROCEDURE — 3074F SYST BP LT 130 MM HG: CPT | Performed by: NURSE PRACTITIONER

## 2023-07-14 RX ORDER — INSULIN GLARGINE 100 [IU]/ML
10 INJECTION, SOLUTION SUBCUTANEOUS EVERY EVENING
COMMUNITY
End: 2023-11-10

## 2023-07-14 RX ORDER — ATORVASTATIN CALCIUM 40 MG/1
40 TABLET, FILM COATED ORAL EVERY EVENING
Qty: 90 TABLET | Refills: 3 | Status: SHIPPED | OUTPATIENT
Start: 2023-07-14 | End: 2024-01-26 | Stop reason: SDUPTHER

## 2023-07-14 RX ORDER — LISINOPRIL 5 MG/1
5 TABLET ORAL DAILY
Qty: 90 TABLET | Refills: 3 | Status: SHIPPED | OUTPATIENT
Start: 2023-07-14 | End: 2024-01-26

## 2023-07-14 RX ORDER — ORAL SEMAGLUTIDE 7 MG/1
7 TABLET ORAL DAILY
COMMUNITY
Start: 2023-04-20

## 2023-07-14 ASSESSMENT — MINNESOTA LIVING WITH HEART FAILURE QUESTIONNAIRE (MLHF)
DIFFICULTY SOCIALIZING WITH FAMILY OR FRIENDS: 5
TIRED, FATIGUED OR LOW ON ENERGY: 5
MAKING YOU WORRY: 5
DIFFICULTY WITH SEXUAL ACTIVITIES: 5
WALKING ABOUT OR CLIMBING STAIRS DIFFICULT: 5
DIFFICULTY WITH RECREATIONAL PASTIMES, SPORTS, HOBBIES: 5
HAVING TO SIT OR LIE DOWN DURING THE DAY: 1
DIFFICULTY WORKING TO EARN A LIVING: 5
MAKING YOU STAY IN A HOSPITAL: 0
EATING LESS FOODS YOU LIKE: 5
MAKING YOU SHORT OF BREATH: 5
MAKING YOU FEEL DEPRESSED: 0
WORKING AROUND THE HOUSE OR YARD DIFFICULT: 5
GIVING YOU SIDE EFFECTS FROM TREATMENTS: 0
DIFFICULTY TO CONCENTRATE OR REMEMBERING THINGS: 0
TOTAL_SCORE: 66
COSTING YOU MONEY FOR MEDICAL CARE: 2
SWELLING IN ANKLES OR LEGS: 5
LOSS OF SELF CONTROL IN YOUR LIFE: 0
DIFFICULTY GOING AWAY FROM HOME: 5
DIFFICULTY SLEEPING WELL AT NIGHT: 3
FEELING LIKE A BURDEN TO FAMILY AND FRIENDS: 0

## 2023-07-14 NOTE — PROGRESS NOTES
Chief Complaint   Patient presents with    Hypertension    Shortness of Breath    Atrial Fibrillation       Subjective     Mark Lopez is a 73 y.o. male who presents today for paroxysmal A-fib, hyperlipidemia, prehydrated follow-up after PEA cardiac arrest after trauma (**).  Patient has additional medical problems of T2DM, CKD, CARMELITA.    Today, patient wishes to understand why he was diagnosed with heart failure in the past.  Patient feels well, denies chest pain, shortness of breath, palpitations, dizziness/lightheadedness, orthopnea, PND or Edema.     We discussed 2016 echocardiogram compared with most recent 2020 echocardiogram.  We discussed guideline recommendations with improved echocardiogram findings.  We will continue his GDMT per below.  I would recommend the patient continues to hold glimepiride as ADR known for heart muscle dysfunction.  We will check blood cell, electrolyte, renal, hepatic, lipid, and thyroid function for high risk medication use.  Patient may proceed with noncardiac surgical procedure as he is euvolemic on exam with current METS described as greater than 4.    We will have patient follow-up in 4 months discussed lab results, sooner if needed.  Past Medical History:   Diagnosis Date    Arthritis     all over    Breath shortness     r/t chf    Cancer (HCC) 2010    thyroid cancer    Cataract     shandra eyes, no surgery yet    CHEST PAIN 4/23/2012    CHF (congestive heart failure) ([facility]) 8//12/16    diastolic    Dental disorder     upper denture    Disorder of thyroid     had surgery, on meds    DM (diabetes mellitus) ([facility]) 4/23/2012    oral meds only    Heart burn     Heart failure ([facility])     High cholesterol     Hyperlipidemia 4/23/2012    Hypertension     Indigestion     Pain     compression fracture of L1    Renal disorder     kidney stones    Renal failure 2015    due to hemorrhage    Sleep apnea     Snoring      Past Surgical History:   Procedure Laterality Date    CATARACT PHACO  WITH IOL Left 08/15/2017    Procedure: CATARACT PHACO WITH IOL;  Surgeon: Osmel Villarreal M.D.;  Location: SURGERY SAME DAY [facility];  Service:     TURP-VAPOR  2016    INCISION AND DRAINAGE GENERAL  09/12/2015    Procedure: INCISION AND DRAINAGE GENERAL ABDOMINAL WASHOUT AND CLOSURE;  Surgeon: Mark Peters M.D.;  Location: SURGERY [facility];  Service:     EXPLORATORY LAPAROTOMY  09/07/2015    Procedure: EXPLORATORY LAPAROTOMY;  Surgeon: Mark Peters M.D.;  Location: SURGERY [facility];  Service:     EXPLORATORY LAPAROTOMY N/A 09/05/2015    Procedure: EXPLORATORY LAPAROTOMY;  Surgeon: Mark Peters M.D.;  Location: SURGERY [facility];  Service:     PARATHYROIDECTOMY  2004    OTHER  01/01/1974    vein stripping rt leg-- groin to ankle    BUNIONECTOMY Left     OTHER      Thyroid    OTHER      Ear tumor    OTHER ABDOMINAL SURGERY      gallbladder    OTHER ORTHOPEDIC SURGERY      right shoulder x 2     Family History   Problem Relation Age of Onset    Heart Attack Father     Heart Disease Father     Heart Disease Brother     Heart Attack Brother      Social History     Socioeconomic History    Marital status:      Spouse name: Not on file    Number of children: Not on file    Years of education: Not on file    Highest education level: Not on file   Occupational History    Not on file   Tobacco Use    Smoking status: Never    Smokeless tobacco: Never   Vaping Use    Vaping Use: Never used   Substance and Sexual Activity    Alcohol use: No    Drug use: Yes     Types: Marijuana     Comment: medical marijuana    Sexual activity: Not on file     Comment: Medical    Other Topics Concern    Not on file   Social History Narrative    Not on file     Social Determinants of Health     Financial Resource Strain: Not on file   Food Insecurity: Not on file   Transportation Needs: Not on file   Physical Activity: Not on file   Stress: Not on file   Social Connections: Not on file    Intimate Partner Violence: Not on file   Housing Stability: Not on file     Allergies   Allergen Reactions    Codeine Vomiting and Nausea     Outpatient Encounter Medications as of 7/14/2023   Medication Sig Dispense Refill    insulin glargine 100 UNIT/ML Solution Pen-injector injection Inject 10 Units under the skin every evening.      apixaban (ELIQUIS) 5mg Tab Take 1 Tablet by mouth 2 times a day. Must complete follow up for future refills. Please call [phone] to schedule a follow up for future refills. Thank you 180 Tablet 0    atorvastatin (LIPITOR) 40 MG Tab Take 1 Tablet by mouth every evening. 90 Tablet 0    fenofibrate (TRICOR) 145 MG Tab fenofibrate nanocrystallized 145 mg tablet   take 1 tablet by mouth once daily      omeprazole (PRILOSEC) 40 MG delayed-release capsule Take 1 Capsule by mouth every morning before breakfast.      lisinopril (PRINIVIL) 10 MG Tab Take 1 Tablet by mouth every day. 90 Tablet 3    Empagliflozin (JARDIANCE) 25 MG Tab Take 25 mg by mouth.      tamsulosin (FLOMAX) 0.4 MG capsule Take 1 Capsule by mouth 1/2 hour after breakfast. 90 Capsule 3    pioglitazone (ACTOS) 30 MG Tab Take 1 Tab by mouth every day.      pregabalin (LYRICA) 150 MG Cap Take 300 mg by mouth 2 times a day.      levothyroxine (SYNTHROID) 175 MCG Tab Take 175 mcg by mouth Every morning on an empty stomach.      cyclobenzaprine (FLEXERIL) 10 mg Tab 1 tablet at bedtime as needed (Patient not taking: Reported on 7/14/2023)      Cholecalciferol 2000 UNIT Cap Take 2,000 Units by mouth every day. (Patient not taking: Reported on 7/14/2023)      FREESTYLE LITE strip  (Patient not taking: Reported on 7/14/2023)  0    cyanocobalamin (VITAMIN B-12) 1000 MCG/ML Solution 1 mL by Intramuscular route every 7 days. (Patient not taking: Reported on 7/14/2023)      glimepiride (AMARYL) 2 MG Tab Take 2 Tabs by mouth every morning. (Patient not taking: Reported on 7/14/2023) 60 Tab 3     No facility-administered encounter  "medications on file as of 7/14/2023.     ROS Complete review of systems negative except as noted in HPI/subjective           Objective     /60 (BP Location: Left arm, Patient Position: Sitting, BP Cuff Size: Adult)   Pulse 85   Resp 16   Ht 1.74 m (5' 8.5\")   Wt 91.6 kg (202 lb)   SpO2 98%   BMI 30.27 kg/m²     Physical Exam  Vitals reviewed.   Constitutional:       Appearance: He is well-developed.   HENT:      Head: Normocephalic and atraumatic.   Eyes:      Pupils: Pupils are equal, round, and reactive to light.   Neck:      Vascular: No JVD.   Cardiovascular:      Rate and Rhythm: Normal rate and regular rhythm.      Heart sounds: Normal heart sounds. No murmur heard.     No friction rub. No gallop.   Pulmonary:      Effort: Pulmonary effort is normal. No respiratory distress.      Breath sounds: Normal breath sounds.   Abdominal:      General: Bowel sounds are normal. There is no distension.      Palpations: Abdomen is soft.   Musculoskeletal:      Right lower leg: No edema.      Left lower leg: No edema.   Skin:     General: Skin is warm and dry.      Findings: No erythema.   Neurological:      Mental Status: He is alert and oriented to person, place, and time.   Psychiatric:         Behavior: Behavior normal.     8/10/2016:  Normal left ventricular systolic function.  Left ventricular ejection fraction is visually estimated to be 55%.  Grade I diastolic dysfunction.  Moderately dilated right ventricle.  The right ventricle was normal in function.  Mild mitral regurgitation.  Mild tricuspid regurgitation.  Right ventricular systolic pressure is estimated to be 40 mmHg.  Compared to the images of the prior study done 8/30/15-  there has been   no significant change.     TTE (3/18/2022)  FINDINGS  Left Ventricle  Normal left ventricular chamber size. Mild concentric left ventricular   hypertrophy. Normal left ventricular systolic function. The left   ventricular ejection fraction is visually " estimated to be 60%. Normal   regional wall motion. Normal diastolic function.  Right Ventricle  Normal right ventricular size and systolic function.  Right Atrium  Normal right atrial size. The inferior vena cava is not well   visualized.  Left Atrium  Mildly dilated left atrium.  Mitral Valve  Structurally normal mitral valve. No mitral stenosis. Trace mitral   regurgitation.  Aortic Valve  Structurally normal aortic valve without significant stenosis.   Tricuspid aortic valve. No aortic insufficiency.  Tricuspid Valve  Structurally normal tricuspid valve. No tricuspid stenosis. Trace   tricuspid regurgitation. Right atrial pressure is estimated to be 3   mmHg. Unable to estimate right ventricular systolic pressure due to an   inadequate tricuspid regurgitant jet.  Pulmonic Valve  Structurally normal pulmonic valve without significant stenosis or   regurgitation.  Pericardium  No pericardial effusion.  Aorta  Normal aortic root for body surface area. The ascending aorta diameter   is  3.5 cm.           Assessment & Plan     No diagnosis found.    Medical Decision Making: Today's Assessment/Status/Plan:        Paroxysmal Atrial Fibrillation (PAF)  - Asymptomatic, denies AF breakthrough, rate controlled.    - On OAC with Eliquis, continue.  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  Hold glimepiride and Actos  -Decrease lisinopril 5 mg daily d/t hypotension.  Check CMP  -Continue atorvastatin 40 mg daily.  Check lipids  -No ASA due to OAC    FU in clinic in 4 months with cardiology. Sooner if needed.    Patient verbalizes understanding and agrees with the plan of care.     I personally spent a total of 32 minutes which includes face-to-face time and non-face-to-face time spent on preparing to see the patient, reviewing prior notes  and tests, obtaining history from the patient, performing a medically appropriate exam, counseling and educating the patient, ordering medications/tests/procedures/referrals as clinically indicated,  and documenting information in the electronic medical record.    JOSE Phelan.   [facility]  [phone]    PLEASE NOTE: This Note was created using voice recognition Software. I have made every reasonable attempt to correct obvious errors, but I expect that there are errors of grammar and possibly content that I did not discover before finalizing the note

## 2023-07-14 NOTE — LETTER
PROCEDURE/SURGERY CLEARANCE FORM      Encounter Date: 7/14/2023    Patient: Mark Lopez  YOB: 1950    CARDIOLOGIST:  SHAHAB Phelan    REFERRING DOCTOR:  No ref. provider found    PATIENT does not have  PPM.    PATIENT does not have  AICD.    The above patient is cleared to have the following procedure/surgery: Hold eliquis for 48 hours prior to procedure                                           Additional comments:                  JOSE Pepe.       I cannot release the above patient for the following procedure/surgery without a cardiology evaluation:                                SHAHAB Pepe

## 2023-07-20 ENCOUNTER — TELEPHONE (OUTPATIENT)
Dept: CARDIOLOGY | Facility: MEDICAL CENTER | Age: 73
End: 2023-07-20
Payer: COMMERCIAL

## 2023-07-20 NOTE — TELEPHONE ENCOUNTER
JG          Caller: Mark Lopez    Topic/issue: Patient was calling about a letter that was sent to him that would clear him for a procedure for a colonoscopy but the letter did not disclose it was supposed to be about that and he was asking for a call back about the letter    Callback Number: [phone]        Thank you    -Ankit DAVIS

## 2023-07-20 NOTE — LETTER
PROCEDURE/SURGERY CLEARANCE FORM      Encounter Date: 7/20/2023    Patient: Mark Lopez  YOB: 1950    CARDIOLOGIST:  JOSE Phelan   REFERRING DOCTOR:  No ref. provider found    PATIENT does not have  PPM.    PATIENT does not have  AICD.      The above patient is cleared to have the following procedure/surgery: Colonoscopy                                           Additional comments: Hold Eliquis for 48 hours prior to procedure          JOSE Phelan     [facility]    Electronically Signed

## 2023-07-21 NOTE — TELEPHONE ENCOUNTER
JG: I was unable to find anything in patients OV note, is it okay to make changes noted below?    ==================    Phone Number Called: [phone]    Call outcome: Spoke to patient regarding message below.    Message: Called to discuss concern with procedure clearance letter.  Pt states that it should say that he has known condition of afib that has been evaluated and should state that the procedure is a colonoscopy

## 2023-11-02 ENCOUNTER — TELEPHONE (OUTPATIENT)
Dept: NEPHROLOGY | Facility: MEDICAL CENTER | Age: 73
End: 2023-11-02
Payer: COMMERCIAL

## 2023-11-02 LAB
CHOLEST SERPL-MCNC: 169 MG/DL
HDLC SERPL-MCNC: 35 MG/DL
LDLC SERPL CALC-MCNC: 105 MG/DL
TRIGL SERPL-MCNC: 164 MG/DL

## 2023-11-03 DIAGNOSIS — Z51.81 INADEQUATE ANTICOAGULATION: ICD-10-CM

## 2023-11-03 DIAGNOSIS — I50.20 ACC/AHA STAGE B SYSTOLIC HEART FAILURE DUE TO ISCHEMIC CARDIOMYOPATHY (HCC): ICD-10-CM

## 2023-11-03 DIAGNOSIS — E11.9 TYPE 2 DIABETES MELLITUS WITHOUT COMPLICATION, WITHOUT LONG-TERM CURRENT USE OF INSULIN (HCC): ICD-10-CM

## 2023-11-03 DIAGNOSIS — I50.31 ACUTE DIASTOLIC HEART FAILURE (HCC): ICD-10-CM

## 2023-11-03 DIAGNOSIS — Z79.01 INADEQUATE ANTICOAGULATION: ICD-10-CM

## 2023-11-03 DIAGNOSIS — I10 ESSENTIAL HYPERTENSION: ICD-10-CM

## 2023-11-03 DIAGNOSIS — E83.42 HYPOMAGNESEMIA: ICD-10-CM

## 2023-11-03 DIAGNOSIS — I46.9 CARDIAC ARREST (HCC): ICD-10-CM

## 2023-11-03 DIAGNOSIS — I25.5 ACC/AHA STAGE B SYSTOLIC HEART FAILURE DUE TO ISCHEMIC CARDIOMYOPATHY (HCC): ICD-10-CM

## 2023-11-03 DIAGNOSIS — E87.6 HYPOKALEMIA: ICD-10-CM

## 2023-11-10 ENCOUNTER — OFFICE VISIT (OUTPATIENT)
Dept: NEPHROLOGY | Facility: MEDICAL CENTER | Age: 73
End: 2023-11-10
Payer: COMMERCIAL

## 2023-11-10 VITALS
SYSTOLIC BLOOD PRESSURE: 122 MMHG | TEMPERATURE: 97.8 F | RESPIRATION RATE: 18 BRPM | HEART RATE: 71 BPM | DIASTOLIC BLOOD PRESSURE: 78 MMHG | WEIGHT: 205 LBS | BODY MASS INDEX: 31.07 KG/M2 | HEIGHT: 68 IN | OXYGEN SATURATION: 98 %

## 2023-11-10 DIAGNOSIS — N40.0 BENIGN PROSTATIC HYPERPLASIA, UNSPECIFIED WHETHER LOWER URINARY TRACT SYMPTOMS PRESENT: ICD-10-CM

## 2023-11-10 DIAGNOSIS — Z79.4 TYPE 2 DIABETES MELLITUS WITH OTHER SPECIFIED COMPLICATION, WITH LONG-TERM CURRENT USE OF INSULIN (HCC): Chronic | ICD-10-CM

## 2023-11-10 DIAGNOSIS — I50.32 CHRONIC DIASTOLIC HEART FAILURE (HCC): ICD-10-CM

## 2023-11-10 DIAGNOSIS — E11.69 TYPE 2 DIABETES MELLITUS WITH OTHER SPECIFIED COMPLICATION, WITH LONG-TERM CURRENT USE OF INSULIN (HCC): Chronic | ICD-10-CM

## 2023-11-10 DIAGNOSIS — N18.31 STAGE 3A CHRONIC KIDNEY DISEASE: ICD-10-CM

## 2023-11-10 DIAGNOSIS — Z86.39 HISTORY OF VITAMIN D DEFICIENCY: ICD-10-CM

## 2023-11-10 DIAGNOSIS — I10 ESSENTIAL HYPERTENSION: Chronic | ICD-10-CM

## 2023-11-10 PROCEDURE — 3074F SYST BP LT 130 MM HG: CPT | Performed by: INTERNAL MEDICINE

## 2023-11-10 PROCEDURE — 99214 OFFICE O/P EST MOD 30 MIN: CPT | Performed by: INTERNAL MEDICINE

## 2023-11-10 PROCEDURE — 3078F DIAST BP <80 MM HG: CPT | Performed by: INTERNAL MEDICINE

## 2023-11-10 RX ORDER — BLOOD-GLUCOSE SENSOR
EACH MISCELLANEOUS
COMMUNITY
Start: 2023-10-06

## 2023-11-10 RX ORDER — GLIMEPIRIDE 2 MG/1
2 TABLET ORAL DAILY
COMMUNITY
Start: 2023-09-08

## 2023-11-10 RX ORDER — ARMODAFINIL 250 MG/1
1 TABLET ORAL DAILY
COMMUNITY

## 2023-11-10 ASSESSMENT — ENCOUNTER SYMPTOMS
SHORTNESS OF BREATH: 0
FEVER: 0
ABDOMINAL PAIN: 0

## 2023-11-10 NOTE — PATIENT INSTRUCTIONS
"\"Mastering Diabetes\" book.     \"Whole-food, plant-based diet\"  The American College of Lifestyle Medicine (ACLM) recommends a whole-food, plant-based diet for prevention and treatment of many diseases including cardiovascular disease, type 2 diabetes, obesity. Plant-based diets can help slow down, and potentially improve, chronic kidney disease.   https://lifestylemedicine.org/articles/benefits-plant-based-nutrition-chronic-kidney-disease/    You can find some good whole-food, plant-based recipes at [employer]/recipes.   Here's a sample menu and shopping list for one week of plant-based meals.  https://www.[employer]/how-tos/plant-based-budget-one-week-vegan-[email or web address]/    https://www.[employer]/slow-cooker-coconut-rivas-lentils/    "

## 2023-11-10 NOTE — PROGRESS NOTES
Chief Complaint   Patient presents with    Follow-Up    Chronic Kidney Disease     CC: f/u CKD    HPI:  Mark Lopez is a 73 y.o. male with a history of MICHAEL episode in 2015 requiring 2 weeks of dialysis, diabetes, hypertension, BPH, CKD 3B who presents today for follow-up.     Complains of fatigue when watching tv or sitting in front of a computer. He has a CPAP and uses it every day.     Re: DM2, diagnosed 2004. Patient hasn't had albuminuria checked. Patient has seen an eye doctor and does not have retinopathy. He wears a CGM. He says his A1c is 6.9%. He's on Jardiance and 3 other pills, not on insulin.     Re: HTN, diagnosed with diastolic CHF in 2016. He thinks he was on BP meds in the 2000's. In 2015, he had low blood pressure readings, and was taken off BP meds and just put on diuretics. He occasionally checks BP at home, usually around 120's / 70's.   He's on lisinopril 5mg daily.     Re: CKD, he had MICHAEL episode in 2015 when he had hemoperitoneum and ATN as a complication from cholecystectomy. He required dialysis for 2-3 weeks. He's had two kidney stones in his life. He denies NSAIDs. Denies bladder issues on tamsulosin. He did have TURP back in 2016 or so.       Past Medical History:   Diagnosis Date    Arthritis     all over    Breath shortness     r/t chf    Cancer (Formerly [facility]) 2010    thyroid cancer    Cataract     shandra eyes, no surgery yet    CHEST PAIN 4/23/2012    CHF (congestive heart failure) (Formerly [facility]) 8//12/16    diastolic    Dental disorder     upper denture    Disorder of thyroid     had surgery, on meds    DM (diabetes mellitus) (Formerly [facility]) 4/23/2012    oral meds only    Heart burn     Heart failure ([facility])     High cholesterol     Hyperlipidemia 4/23/2012    Hypertension     Indigestion     Pain     compression fracture of L1    Renal disorder     kidney stones    Renal failure 2015    due to hemorrhage    Sleep apnea     Snoring        Past Surgical History:   Procedure Laterality Date    CATARACT PHACO  WITH IOL Left 08/15/2017    Procedure: CATARACT PHACO WITH IOL;  Surgeon: Osmel Villarreal M.D.;  Location: SURGERY SAME DAY [facility];  Service:     TURP-VAPOR  2016    INCISION AND DRAINAGE GENERAL  09/12/2015    Procedure: INCISION AND DRAINAGE GENERAL ABDOMINAL WASHOUT AND CLOSURE;  Surgeon: Mark Peters M.D.;  Location: SURGERY [facility];  Service:     EXPLORATORY LAPAROTOMY  09/07/2015    Procedure: EXPLORATORY LAPAROTOMY;  Surgeon: Mark Peters M.D.;  Location: SURGERY [facility];  Service:     EXPLORATORY LAPAROTOMY N/A 09/05/2015    Procedure: EXPLORATORY LAPAROTOMY;  Surgeon: Mark Peters M.D.;  Location: SURGERY [facility];  Service:     PARATHYROIDECTOMY  2004    OTHER  01/01/1974    vein stripping rt leg-- groin to ankle    BUNIONECTOMY Left     OTHER      Thyroid    OTHER      Ear tumor    OTHER ABDOMINAL SURGERY      gallbladder    OTHER ORTHOPEDIC SURGERY      right shoulder x 2        Outpatient Encounter Medications as of 11/10/2023   Medication Sig Dispense Refill    glimepiride (AMARYL) 2 MG Tab Take 2 mg by mouth every day.      RYBELSUS 7 MG Tab Take 7 mg by mouth every day.      apixaban (ELIQUIS) 5mg Tab Take 1 Tablet by mouth 2 times a day. 180 Tablet 3    atorvastatin (LIPITOR) 40 MG Tab Take 1 Tablet by mouth every evening. 90 Tablet 3    lisinopril (PRINIVIL) 5 MG Tab Take 1 Tablet by mouth every day. 90 Tablet 3    fenofibrate (TRICOR) 145 MG Tab fenofibrate nanocrystallized 145 mg tablet   take 1 tablet by mouth once daily      omeprazole (PRILOSEC) 40 MG delayed-release capsule Take 1 Capsule by mouth every morning before breakfast.      Empagliflozin (JARDIANCE) 25 MG Tab Take 25 mg by mouth.      tamsulosin (FLOMAX) 0.4 MG capsule Take 1 Capsule by mouth 1/2 hour after breakfast. 90 Capsule 3    pioglitazone (ACTOS) 30 MG Tab Take 1 Tab by mouth every day.      pregabalin (LYRICA) 150 MG Cap Take 300 mg by mouth 2 times a day.       "levothyroxine (SYNTHROID) 175 MCG Tab Take 175 mcg by mouth Every morning on an empty stomach.      insulin glargine 100 UNIT/ML Solution Pen-injector injection Inject 10 Units under the skin every evening. (Patient not taking: Reported on 11/10/2023)       No facility-administered encounter medications on file as of 11/10/2023.        Allergies   Allergen Reactions    Codeine Vomiting and Nausea             Review of Systems   Constitutional:  Negative for fever.   Respiratory:  Negative for shortness of breath.    Cardiovascular:  Negative for chest pain.   Gastrointestinal:  Negative for abdominal pain.   Genitourinary:  Negative for dysuria and hematuria.   All other systems reviewed and are negative.      /78 (BP Location: Right arm, Patient Position: Sitting, BP Cuff Size: Adult)   Pulse 71   Temp 36.6 °C (97.8 °F) (Temporal)   Resp 18   Ht 1.727 m (5' 8\")   Wt 93 kg (205 lb)   SpO2 98%   BMI 31.17 kg/m²     Physical Exam  Constitutional:       General: He is not in acute distress.  HENT:      Mouth/Throat:      Pharynx: No oropharyngeal exudate.   Eyes:      General: No scleral icterus.  Neck:      Trachea: No tracheal deviation.   Cardiovascular:      Rate and Rhythm: Normal rate. Rhythm irregular.      Heart sounds: Normal heart sounds. No murmur heard.  Pulmonary:      Effort: Pulmonary effort is normal.      Breath sounds: Normal breath sounds. No stridor. No rales.   Abdominal:      General: Bowel sounds are normal.      Palpations: Abdomen is soft.      Tenderness: There is no abdominal tenderness.   Musculoskeletal:         General: Normal range of motion.      Cervical back: Neck supple.      Right lower leg: No edema.      Left lower leg: No edema.   Skin:     General: Skin is warm and dry.      Findings: No rash.   Neurological:      General: No focal deficit present.      Mental Status: He is alert and oriented to person, place, and time.   Psychiatric:         Mood and Affect: Mood " and affect normal.         Behavior: Behavior normal.         Labs reviewed.    Labs 10/27/2023  Hemoglobin 17.3  Platelets 200  Sodium 140  Potassium 4.2  Chloride 106  CO2 25  BUN 35  Creatinine 1.5  Estimated GFR 49  Glucose 134  Calcium 9.5  Phosphorus 3.6  Albumin 3.7  Vitamin D level 50  Intact PTH 43  Urinalysis with greater than 1000 glucose, negative ketone, negative protein, negative blood, negative leukocyte Esterase  Urine albumin creatinine ratio 59 mg/g  Urine protein creatinine ratio 215 mg/g    Labs 10/20/2022  Hemoglobin 16.9  Hemoglobin A1c 8.5%  Sodium 140  Potassium 5.5  Chloride 103  CO2 26  BUN 29  Creatinine 1.6  Estimated GFR 46  Glucose 116  Calcium 9.6  Albumin 4.2  Phosphorus 4.3  Intact PTH 47  Vitamin D level 30.1  Urinalysis with 500 glucose, negative ketone, negative protein, negative blood, negative leukocyte esterase  Urine albumin creatinine ratio 20 mg/g  Urine protein creatinine ratio 133 mg/g    Labs 8/17/2022  Sodium 141  Potassium 4.8  Chloride 105  CO2 26  BUN 28  Creatinine 1.6  Estimated GFR 46  Glucose 158  Calcium 9.5  Phosphorus 3.3  Albumin 4.2  Intact PTH 38    Labs 6/1/2022  Hemoglobin 17.2  Platelets 241  Sodium 140  Potassium 4.5  Chloride 105  CO2 25  BUN 36  Creatinine 2.1  Estimated GFR 33  Glucose 170  Calcium 9.9  Phosphorus 4.0  Albumin 4.5  Intact PTH 38  Urine protein not quantified      Recent Labs     11/02/23  0000   HDL 35   TRIGLYCERIDE 164         Lab Results   Component Value Date/Time    WBC 6.4 04/01/2022 10:34 AM    RBC 6.38 (H) 04/01/2022 10:34 AM    HEMOGLOBIN 16.3 04/01/2022 10:34 AM    HEMATOCRIT 52.2 (H) 04/01/2022 10:34 AM    MCV 81.8 04/01/2022 10:34 AM    MCH 25.5 (L) 04/01/2022 10:34 AM    MCHC 31.2 (L) 04/01/2022 10:34 AM    MPV 10.8 09/16/2016 10:32 AM             URINALYSIS:  Lab Results   Component Value Date/Time    COLORURINE Dark-Red 09/01/2015 0400    CLARITY Cloudy (A) 09/01/2015 0400    SPECGRAVITY 1.017 09/01/2015 0400    PHURINE  6.0 09/01/2015 0400    KETONES 10 (A) 09/01/2015 0400    PROTEINURIN 100 (A) 09/01/2015 0400    BILIRUBINUR Negative 09/01/2015 0400    NITRITE Negative 09/01/2015 0400    LEUKESTERAS Large (A) 09/01/2015 0400    OCCULTBLOOD Large (A) 09/01/2015 0400       UPC  No results found for: TOTPROTUR   Lab Results   Component Value Date/Time    CREATININEU 179.10 08/30/2015 1400         Imaging reviewed  No orders to display         Assessment:  Mark Lopez is a 73 y.o. male with a history of MICHAEL episode in 2015 requiring 2 weeks of dialysis, diabetes, hypertension, BPH, CKD 3B who presents today for follow-up.     Plan:  1. Stage 3a chronic kidney disease (HCC)  -Creatinine and GFR stable within stage IIIa CKD.  Underlying CKD likely from diabetic nephropathy given nearly 20 years of poorly controlled diabetes, as well as MICHAEL episode in 2015 from ATN from shock and hemoperitoneum requiring about 2 weeks of dialysis.  I recommend avoiding NSAIDs and other nephrotoxins.  I recommend a low-salt diet.  I explained the importance of glycemic and blood pressure control to help slow CKD progression.  Maintain Jardiance and low-dose lisinopril for long-term kidney protection.  Patient is at mild risk of CKD progression given his microalbuminuria.  I recommend plant-based diet to help protect kidney function.    2. Type 2 diabetes mellitus with other specified complication, with long-term current use of insulin (HCC)  -Better controlled with CGM in place.  Maintain SGLT2 inhibitor and lisinopril for long-term kidney protection.  If diabetes improves with change to plant-based diet, I would recommend reducing or eliminating pioglitazone and/or glimepiride prior to eliminating Rybelsus or Jardiance.    3. Essential hypertension  -Well-controlled.  Maintain lisinopril 5 mg daily as patient has low blood pressure with higher doses.    4. Benign prostatic hyperplasia, unspecified whether lower urinary tract symptoms  present  -Patient has a history of TURP, but still has occasional symptoms of BPH, continue tamsulosin 0.4 mg p.o. daily.    5. Chronic diastolic heart failure (HCC)  -Patient appears euvolemic on my exam.  Continue lisinopril alone at this time.  Patient does not appear to need loop diuretic therapy at this time.    6.  History of presumed hypoparathyroidism  -It is unclear to me why patient was previously prescribed vitamin D receptor agonist in the past.  I stopped his paricalcitol and doxercalciferol in summer 2022.  His PTH remains in the normal range.  Vitamin D is normal.  I recommend maintenance over-the-counter vitamin D 2000 units daily or 5000 units thrice weekly.      Return to clinic 12 months with pre-clinic labs    Fabian Greene MD  Nephrology

## 2023-11-17 ENCOUNTER — APPOINTMENT (OUTPATIENT)
Dept: CARDIOLOGY | Facility: MEDICAL CENTER | Age: 73
End: 2023-11-17
Attending: NURSE PRACTITIONER
Payer: MEDICARE

## 2023-11-20 ENCOUNTER — TELEPHONE (OUTPATIENT)
Dept: CARDIOLOGY | Facility: MEDICAL CENTER | Age: 73
End: 2023-11-20
Payer: COMMERCIAL

## 2023-11-20 NOTE — TELEPHONE ENCOUNTER
Comment visible in Mychart for patient. No further action needed at this time.     ----- Message from SHAHAB Phelan sent at 11/20/2023 12:53 PM PST -----  Your diagnostic results have been reviewed. No acute changes noted. Please follow up as scheduled. Thank you.

## 2024-01-26 ENCOUNTER — OFFICE VISIT (OUTPATIENT)
Dept: CARDIOLOGY | Facility: MEDICAL CENTER | Age: 74
End: 2024-01-26
Attending: INTERNAL MEDICINE
Payer: COMMERCIAL

## 2024-01-26 ENCOUNTER — APPOINTMENT (OUTPATIENT)
Dept: CARDIOLOGY | Facility: MEDICAL CENTER | Age: 74
End: 2024-01-26
Attending: INTERNAL MEDICINE
Payer: MEDICARE

## 2024-01-26 VITALS
WEIGHT: 206 LBS | BODY MASS INDEX: 31.22 KG/M2 | RESPIRATION RATE: 16 BRPM | HEART RATE: 62 BPM | HEIGHT: 68 IN | DIASTOLIC BLOOD PRESSURE: 64 MMHG | SYSTOLIC BLOOD PRESSURE: 118 MMHG | OXYGEN SATURATION: 99 %

## 2024-01-26 DIAGNOSIS — I25.10 CORONARY ARTERY DISEASE DUE TO CALCIFIED CORONARY LESION: ICD-10-CM

## 2024-01-26 DIAGNOSIS — I48.0 PAROXYSMAL ATRIAL FIBRILLATION (HCC): ICD-10-CM

## 2024-01-26 DIAGNOSIS — E78.2 MIXED HYPERLIPIDEMIA: ICD-10-CM

## 2024-01-26 DIAGNOSIS — I10 ESSENTIAL HYPERTENSION: Chronic | ICD-10-CM

## 2024-01-26 DIAGNOSIS — I25.84 CORONARY ARTERY DISEASE DUE TO CALCIFIED CORONARY LESION: ICD-10-CM

## 2024-01-26 DIAGNOSIS — E11.9 TYPE 2 DIABETES MELLITUS WITHOUT COMPLICATION, WITHOUT LONG-TERM CURRENT USE OF INSULIN (HCC): ICD-10-CM

## 2024-01-26 DIAGNOSIS — D68.69 HYPERCOAGULABLE STATE DUE TO PAROXYSMAL ATRIAL FIBRILLATION (HCC): ICD-10-CM

## 2024-01-26 DIAGNOSIS — I25.10 CORONARY ARTERY CALCIFICATION: ICD-10-CM

## 2024-01-26 DIAGNOSIS — I25.84 CORONARY ARTERY CALCIFICATION: ICD-10-CM

## 2024-01-26 DIAGNOSIS — I48.0 HYPERCOAGULABLE STATE DUE TO PAROXYSMAL ATRIAL FIBRILLATION (HCC): ICD-10-CM

## 2024-01-26 PROCEDURE — 3074F SYST BP LT 130 MM HG: CPT | Performed by: INTERNAL MEDICINE

## 2024-01-26 PROCEDURE — 99213 OFFICE O/P EST LOW 20 MIN: CPT | Performed by: INTERNAL MEDICINE

## 2024-01-26 PROCEDURE — 3078F DIAST BP <80 MM HG: CPT | Performed by: INTERNAL MEDICINE

## 2024-01-26 PROCEDURE — 99214 OFFICE O/P EST MOD 30 MIN: CPT | Performed by: INTERNAL MEDICINE

## 2024-01-26 RX ORDER — ATORVASTATIN CALCIUM 80 MG/1
80 TABLET, FILM COATED ORAL EVERY EVENING
Qty: 90 TABLET | Refills: 3 | Status: SHIPPED | OUTPATIENT
Start: 2024-01-26

## 2024-01-26 RX ORDER — LOSARTAN POTASSIUM 50 MG/1
50 TABLET ORAL DAILY
COMMUNITY

## 2024-01-26 ASSESSMENT — ENCOUNTER SYMPTOMS
ORTHOPNEA: 0
PND: 0
LOSS OF CONSCIOUSNESS: 0
MYALGIAS: 0
COUGH: 0
SHORTNESS OF BREATH: 0
DIZZINESS: 0
PALPITATIONS: 0
ABDOMINAL PAIN: 0

## 2024-01-26 NOTE — PROGRESS NOTES
Cardiology Initial Consultation Note    Date of note:    1/26/2024    Primary Care Provider: Jones Au M.D.  Referring Provider: No ref. provider found    Patient Name: Mark Lopez   YOB: 1950  MRN:              [id number]    Chief Complaint   Patient presents with    Congestive Heart Failure     F/V Dx: ACC/AHA stage B systolic heart failure due to ischemic cardiomyopathy (HCC)    Hypertension     F/V Dx: Essential hypertension    Atrial Fibrillation     F/V Dx: Paroxysmal atrial fibrillation (HCC)       History of Present Illness: Mr. Mark Lopez is a 73-year-old man with past medical history significant for paroxysmal atrial fibrillation, hypertension, hyperlipidemia, type 2 diabetes mellitus, CARMELITA, CKD who presents to the cardiology office for follow-up.    Patient was last seen in the office on 07/14/2023 by Opal Dexter.  During that visit, he was asymptomatic and had no major cardiac complaints.  Due to mild hypotension, lisinopril was decreased to 5 mg daily.    Since his last office visit, has generally been okay from cardiac standpoint.  Patient underwent recent CT imaging of the chest given his history of asbestosis exposure in the past.  There were findings of pleural plaques consistent with history of asbestos exposure.  Incidentally found to have coronary calcification/atherosclerosis.  Given this finding, he wanted additional recommendations regarding his overall cardiovascular health.    Today, he has no major cardiac complaints.  He tells me that he stays very active especially with yard work cleaning shoveling snow during the winter.  Has not experienced any exertional chest pain or dyspnea.  He denies having myalgias.  No lower extremity edema, no orthopnea, PND or episodes of syncope.      Cardiovascular Risk Factors:  1. Smoking status: Denies  2. Type II Diabetes Mellitus: Yes   Lab Results   Component Value Date/Time    HBA1C 8.2 (H) 12/23/2015 03:12 PM     HBA1C 8.9 (H) 08/30/2015 06:04 AM     3. Hypertension: Yes  4. Dyslipidemia: Yes   Cholesterol,Tot   Date Value Ref Range Status   11/02/2023 169  Final     LDL   Date Value Ref Range Status   11/02/2023 105  Final     HDL   Date Value Ref Range Status   11/02/2023 35  Final     Triglycerides   Date Value Ref Range Status   11/02/2023 164  Final     5. Family history of early Coronary Artery Disease in a first degree relative (Male less than 55 years of age; Female less than 65 years of age): Denies      Review of Systems:  Review of Systems   Constitutional:  Negative for malaise/fatigue.   Respiratory:  Negative for cough and shortness of breath.    Cardiovascular:  Negative for chest pain, palpitations, orthopnea, leg swelling and PND.   Gastrointestinal:  Negative for abdominal pain.   Musculoskeletal:  Negative for joint pain and myalgias.   Neurological:  Negative for dizziness and loss of consciousness.       Past Medical History:   Diagnosis Date    Arthritis     all over    Breath shortness     r/t chf    Cancer (Formerly [facility]) 2010    thyroid cancer    Cataract     shandra eyes, no surgery yet    CHEST PAIN 4/23/2012    CHF (congestive heart failure) (Formerly [facility]) 8//12/16    diastolic    Dental disorder     upper denture    Disorder of thyroid     had surgery, on meds    DM (diabetes mellitus) (Formerly [facility]) 4/23/2012    oral meds only    Heart burn     Heart failure ([facility])     High cholesterol     Hyperlipidemia 4/23/2012    Hypertension     Indigestion     Pain     compression fracture of L1    Renal disorder     kidney stones    Renal failure 2015    due to hemorrhage    Sleep apnea     Snoring          Past Surgical History:   Procedure Laterality Date    CATARACT PHACO WITH IOL Left 08/15/2017    Procedure: CATARACT PHACO WITH IOL;  Surgeon: Osmel Villarreal M.D.;  Location: SURGERY SAME DAY [facility];  Service:     TURP-VAPOR  2016    INCISION AND DRAINAGE GENERAL  09/12/2015    Procedure: INCISION AND DRAINAGE GENERAL  ABDOMINAL WASHOUT AND CLOSURE;  Surgeon: Mark Peters M.D.;  Location: SURGERY [facility];  Service:     EXPLORATORY LAPAROTOMY  09/07/2015    Procedure: EXPLORATORY LAPAROTOMY;  Surgeon: Mark Peters M.D.;  Location: SURGERY [facility];  Service:     EXPLORATORY LAPAROTOMY N/A 09/05/2015    Procedure: EXPLORATORY LAPAROTOMY;  Surgeon: Mark Peters M.D.;  Location: SURGERY [facility];  Service:     PARATHYROIDECTOMY  2004    OTHER  01/01/1974    vein stripping rt leg-- groin to ankle    BUNIONECTOMY Left     OTHER      Thyroid    OTHER      Ear tumor    OTHER ABDOMINAL SURGERY      gallbladder    OTHER ORTHOPEDIC SURGERY      right shoulder x 2         Current Outpatient Medications   Medication Sig Dispense Refill    losartan (COZAAR) 50 MG Tab Take 50 mg by mouth every day.      atorvastatin (LIPITOR) 80 MG tablet Take 1 Tablet by mouth every evening. 90 Tablet 3    glimepiride (AMARYL) 2 MG Tab Take 2 mg by mouth every day.      Armodafinil 250 MG Tab Take 1 Tablet by mouth every day.      RYBELSUS 7 MG Tab Take 7 mg by mouth every day.      apixaban (ELIQUIS) 5mg Tab Take 1 Tablet by mouth 2 times a day. 180 Tablet 3    fenofibrate (TRICOR) 145 MG Tab fenofibrate nanocrystallized 145 mg tablet   take 1 tablet by mouth once daily      omeprazole (PRILOSEC) 40 MG delayed-release capsule Take 1 Capsule by mouth every morning before breakfast.      Empagliflozin (JARDIANCE) 25 MG Tab Take 25 mg by mouth.      tamsulosin (FLOMAX) 0.4 MG capsule Take 1 Capsule by mouth 1/2 hour after breakfast. 90 Capsule 3    pioglitazone (ACTOS) 30 MG Tab Take 1 Tab by mouth every day.      pregabalin (LYRICA) 150 MG Cap Take 300 mg by mouth 2 times a day.      levothyroxine (SYNTHROID) 175 MCG Tab Take 175 mcg by mouth Every morning on an empty stomach.      Continuous Blood Gluc Sensor (FREESTYLE CHICHI 3 SENSOR) Misc as directed EVERY 14 DAYS (Patient not taking: Reported on 1/26/2024)    "    No current facility-administered medications for this visit.         Allergies   Allergen Reactions    Codeine Vomiting and Nausea         Family History   Problem Relation Age of Onset    Heart Attack Father     Heart Disease Father     Heart Disease Brother     Heart Attack Brother          Social History     Socioeconomic History    Marital status:      Spouse name: Not on file    Number of children: Not on file    Years of education: Not on file    Highest education level: Not on file   Occupational History    Not on file   Tobacco Use    Smoking status: Never    Smokeless tobacco: Never   Vaping Use    Vaping Use: Never used   Substance and Sexual Activity    Alcohol use: No    Drug use: Yes     Types: Marijuana     Comment: medical marijuana    Sexual activity: Not on file     Comment: Medical    Other Topics Concern    Not on file   Social History Narrative    Not on file     Social Determinants of Health     Financial Resource Strain: Not on file   Food Insecurity: Not on file   Transportation Needs: Not on file   Physical Activity: Not on file   Stress: Not on file   Social Connections: Not on file   Intimate Partner Violence: Not on file   Housing Stability: Not on file         Physical Exam:  Ambulatory Vitals  /64 (BP Location: Left arm, Patient Position: Sitting, BP Cuff Size: Adult)   Pulse 62   Resp 16   Ht 1.727 m (5' 8\")   Wt 93.4 kg (206 lb)   SpO2 99%    Oxygen Therapy:  Pulse Oximetry: 99 %  BP Readings from Last 4 Encounters:   01/26/24 118/64   11/10/23 122/78   07/14/23 104/60   11/04/22 100/68       Weight/BMI: Body mass index is 31.32 kg/m².  Wt Readings from Last 4 Encounters:   01/26/24 93.4 kg (206 lb)   11/10/23 93 kg (205 lb)   07/14/23 91.6 kg (202 lb)   11/04/22 88.9 kg (196 lb)         General: Not in acute distress, appears comfortable  Neck:  No carotid bruits, No JVD appreciated  CVS:  RRR, Normal S1, S2. No murmurs, rubs or gallops  Resp: Normal respiratory " effort, lungs CTA bilaterally. No rales or rhonchi  Abdomen: Soft, non-distended, non-tender to palpation  Skin: No obvious rashes, no cyanosis  Neurological: Alert and oriented x3, moves all extremities, no focal neurologic deficits  Psychiatric: Appropriate affect  Extremities:   Extremities warm, pulses intact, no edema      Lab Data Review:  Lab Results   Component Value Date/Time    CHOLSTRLTOT 169 11/02/2023 12:00 AM     11/02/2023 12:00 AM    HDL 35 11/02/2023 12:00 AM    TRIGLYCERIDE 164 11/02/2023 12:00 AM       Lab Results   Component Value Date/Time    SODIUM 139 04/01/2022 10:34 AM    POTASSIUM 5.3 04/01/2022 10:34 AM    CHLORIDE 104 04/01/2022 10:34 AM    CO2 21 04/01/2022 10:34 AM    GLUCOSE 147 (H) 04/01/2022 10:34 AM    BUN 30 (H) 04/01/2022 10:34 AM    CREATININE 2.06 (H) 04/01/2022 10:34 AM    CREATININE 0.9 02/09/2005 10:25 AM     Lab Results   Component Value Date/Time    ALKPHOSPHAT 87 04/01/2022 10:34 AM    ASTSGOT 19 04/01/2022 10:34 AM    ALTSGPT 16 04/01/2022 10:34 AM    TBILIRUBIN 0.4 04/01/2022 10:34 AM      Lab Results   Component Value Date/Time    WBC 6.4 04/01/2022 10:34 AM    HEMOGLOBIN 16.3 04/01/2022 10:34 AM     Lab Results   Component Value Date/Time    HBA1C 8.2 (H) 12/23/2015 03:12 PM    HBA1C 8.9 (H) 08/30/2015 06:04 AM         Cardiac Imaging and Procedures Review:    EKG dated 2/16/2022:   My personal interpretation is atrial fibrillation  EKG dated 2017:   My personal interpretation is sinus bradycardia    Echo dated 3/18/2022:   Mild concentric LVH  Normal LV systolic function  Left ventricular ejection fraction 60%  Normal right ventricular size and function  Mildly dilated left atrium  Trace mitral regurgitation  Trace tricuspid regurgitation  Normal aortic root and ascending aorta size.    Last Lipid panel 2023:   , Trig > 150.    Assessment & Plan     1. Coronary artery calcification  Lipid Profile      2. Coronary artery disease due to calcified coronary  lesion  Lipid Profile      3. Paroxysmal atrial fibrillation (HCC)        4. Hypercoagulable state due to paroxysmal atrial fibrillation (HCC)        5. Essential hypertension        6. Mixed hyperlipidemia        7. Type 2 diabetes mellitus without complication, without long-term current use of insulin (HCC)  atorvastatin (LIPITOR) 80 MG tablet            Medical Decision Making:  Mr. Mark Lopez is a 73-year-old man with past medical history significant for paroxysmal atrial fibrillation, hypertension, hyperlipidemia, type 2 diabetes mellitus, CARMELITA, CKD who presents to the cardiology office for follow-up.    1.  Coronary artery calcification  2.  Coronary artery disease due to calcified coronary lesion  -Patient was found to have coronary artery calcification on CT of the chest.  Explained what this entails.  Explained natural history of coronary atherosclerosis/CAD.  He is currently asymptomatic without exertional symptoms of chest pain or dyspnea.  Given his comorbidities including type 2 diabetes mellitus which is suboptimal as well as hypertension and mixed hyperlipidemia, I explained that he needs aggressive cardiovascular risk factor modification to help in management of CAD.  -At this time, review of his lipid panel shows his LDL is mildly elevated and above goal.  As such we will increase atorvastatin 80 mg daily.  Continue fenofibrate given his triglycerides.  -Discussed lifestyle modifications including exercise and dietary changes.  Recommend at least 150 minutes of moderate intensity exercise or 75 minutes of vigorous aerobic activity on a weekly basis.  Recommend dietary changes including incorporating heart healthy, AHA/Mediterranean diet.    3. Paroxysmal atrial fibrillation (HCC)  4. Hypercoagulable state due to paroxysmal atrial fibrillation (HCC)  -Remains stable from a cardiovascular standpoint.  Rhythm appears regular on exam likely reflecting sinus.  Continue Eliquis 5 mg twice daily for  long-term thromboembolism prophylaxis given his elevated [id number] score.    5. Essential hypertension  -Blood pressure is controlled and at goal of less than 130/80 mmHg.  Continue losartan 50 mg daily    6. Mixed hyperlipidemia  -Increase atorvastatin to 80 mg daily.  Continue fenofibrate    7. Type 2 diabetes mellitus with other specified complication, with long-term current use of insulin (HCC)  -Last A1c greater than 7.  Being managed by PCP.      It was a pleasure seeing Mr. Mark Lopez in the office today. Return in about 6 months (around 7/26/2024) for Atrial fibrillation, Coronary artery disease. Patient is aware to call the cardiology clinic with any questions or concerns.      Izaiah Payton MD, [facility]  Cardiologist, [facility], [facility]  [address] [address]  Phone: [phone]  Fax: [phone]    Please note that this dictation was created using voice recognition software. I have made every reasonable attempt to correct obvious errors, but it is possible there are errors of grammar and possibly content that I did not discover before finalizing the note.

## 2024-02-16 ENCOUNTER — PATIENT MESSAGE (OUTPATIENT)
Dept: HEALTH INFORMATION MANAGEMENT | Facility: OTHER | Age: 74
End: 2024-02-16

## 2024-03-14 ENCOUNTER — OFFICE VISIT (OUTPATIENT)
Dept: SLEEP MEDICINE | Facility: MEDICAL CENTER | Age: 74
End: 2024-03-14
Attending: INTERNAL MEDICINE
Payer: COMMERCIAL

## 2024-03-14 ENCOUNTER — HOSPITAL ENCOUNTER (OUTPATIENT)
Dept: RADIOLOGY | Facility: MEDICAL CENTER | Age: 74
End: 2024-03-14
Attending: FAMILY MEDICINE

## 2024-03-14 VITALS
WEIGHT: 200 LBS | HEIGHT: 68 IN | DIASTOLIC BLOOD PRESSURE: 72 MMHG | OXYGEN SATURATION: 98 % | HEART RATE: 70 BPM | BODY MASS INDEX: 30.31 KG/M2 | SYSTOLIC BLOOD PRESSURE: 124 MMHG

## 2024-03-14 DIAGNOSIS — J92.0 ASBESTOS-INDUCED PLEURAL PLAQUE: ICD-10-CM

## 2024-03-14 DIAGNOSIS — R06.02 SOB (SHORTNESS OF BREATH): ICD-10-CM

## 2024-03-14 DIAGNOSIS — I48.91 ATRIAL FIBRILLATION, UNSPECIFIED TYPE (HCC): ICD-10-CM

## 2024-03-14 DIAGNOSIS — R91.8 PULMONARY NODULES: ICD-10-CM

## 2024-03-14 PROCEDURE — 3078F DIAST BP <80 MM HG: CPT | Performed by: INTERNAL MEDICINE

## 2024-03-14 PROCEDURE — 3074F SYST BP LT 130 MM HG: CPT | Performed by: INTERNAL MEDICINE

## 2024-03-14 PROCEDURE — 99212 OFFICE O/P EST SF 10 MIN: CPT | Performed by: INTERNAL MEDICINE

## 2024-03-14 PROCEDURE — 99204 OFFICE O/P NEW MOD 45 MIN: CPT | Performed by: INTERNAL MEDICINE

## 2024-03-14 ASSESSMENT — ENCOUNTER SYMPTOMS
WHEEZING: 0
FOCAL WEAKNESS: 0
BACK PAIN: 0
DIZZINESS: 0
CONSTIPATION: 0
ABDOMINAL PAIN: 0
CLAUDICATION: 0
NECK PAIN: 0
NAUSEA: 0
DIARRHEA: 0
STRIDOR: 0
BLURRED VISION: 0
PND: 0
PHOTOPHOBIA: 0
TREMORS: 0
WEAKNESS: 0
VOMITING: 0
EYE REDNESS: 0
FEVER: 0
HEADACHES: 0
SPUTUM PRODUCTION: 0
CHILLS: 0
EYE PAIN: 0
DEPRESSION: 0
EYE DISCHARGE: 0
WEIGHT LOSS: 0
SHORTNESS OF BREATH: 1
ORTHOPNEA: 0
DIAPHORESIS: 0
SPEECH CHANGE: 0
HEMOPTYSIS: 0
MYALGIAS: 0
COUGH: 0
SINUS PAIN: 0
PALPITATIONS: 0
SORE THROAT: 0
DOUBLE VISION: 0
FALLS: 0
HEARTBURN: 0

## 2024-03-14 ASSESSMENT — PATIENT HEALTH QUESTIONNAIRE - PHQ9: CLINICAL INTERPRETATION OF PHQ2 SCORE: 0

## 2024-03-14 NOTE — PROGRESS NOTES
Chief Complaint   Patient presents with    New Patient     REF BY DR. WEINSTEIN FOR Pleural plaque with presence of asbestos    Results     CT CHEST 1/10/24       HPI: This patient is a 73 y.o. male presenting for evaluation of pleural plaques on CT imaging from January 2024.  The patient's past history significant for hypothyroid on supplementation, type 2 diabetes, atrial fibrillation on chronic anticoagulation, dyslipidemia, obstructive sleep apnea on CPAP therapy managed by primary care and chronic fatigue on armodafinil.  He was hospitalized in 2015 after complications following cholecystectomy with intra-abdominal bleeding complicated by renal failure and cardiac arrest.  He did require temporary hemodialysis and for the most part has recovered but is left with some residual left-sided weakness and muscular atrophy.  No evidence for CVA per patient.  He smoked socially for period of roughly 4 years in the 1970s but tobacco free for greater than 20 years.  He worked in a steel mill as a  and a .  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  He retired from a career as a  for [employer] and had various exposures there as well.  No family history of atopic or autoimmune disease.  Father had lung cancer but was a tobacco smoker.  He had incidental finding of pulmonary nodules on CT of the abdomen and pelvis to evaluate for renal stones which was followed up with a dedicated CT chest on January 10, 2024.  This showed pleural plaques and subcentimeter pulmonary nodules up to 4 mm in size.  No evidence for interstitial lung disease or lymphadenopathy.  No significant parenchymal lung disease.  The patient denies chronic cough but does have mild shortness of breath.  No chest pain.  No edema.  Normal  echo on file from June 2022.  Past Medical History:   Diagnosis Date    Arthritis     all over    Breath shortness     r/t chf    Cancer (Formerly [facility]) 2010    thyroid cancer    Cataract     shandra eyes, no surgery yet    CHEST PAIN 4/23/2012    CHF (congestive heart failure) (Formerly [facility]) 8//12/16    diastolic    Dental disorder     upper denture    Disorder of thyroid     had surgery, on meds    DM (diabetes mellitus) (Formerly [facility]) 4/23/2012    oral meds only    Heart burn     Heart failure (Formerly [facility])     High cholesterol     Hyperlipidemia 4/23/2012    Hypertension     Indigestion     Pain     compression fracture of L1    Renal disorder     kidney stones    Renal failure 2015    due to hemorrhage    Sleep apnea     Snoring        Social History     Socioeconomic History    Marital status:      Spouse name: Not on file    Number of children: Not on file    Years of education: Not on file    Highest education level: Not on file   Occupational History    Not on file   Tobacco Use    Smoking status: Never    Smokeless tobacco: Never   Vaping Use    Vaping Use: Never used   Substance and Sexual Activity    Alcohol use: No    Drug use: Yes     Types: Marijuana     Comment: medical marijuana    Sexual activity: Not on file     Comment: Medical    Other Topics Concern    Not on file   Social History Narrative    Not on file     Social Determinants of Health     Financial Resource Strain: Not on file   Food Insecurity: Not on file   Transportation Needs: Not on file   Physical Activity: Not on file   Stress: Not on file   Social Connections: Not on file   Intimate Partner Violence: Not on file   Housing Stability: Not on file       Family History   Problem Relation Age of Onset    Heart Attack Father     Heart Disease Father     Heart Disease Brother     Heart Attack Brother        Current Outpatient Medications on File Prior to Visit   Medication Sig Dispense Refill    losartan (COZAAR) 50 MG Tab Take 50 mg by mouth every day.      atorvastatin  (LIPITOR) 80 MG tablet Take 1 Tablet by mouth every evening. 90 Tablet 3    glimepiride (AMARYL) 2 MG Tab Take 2 mg by mouth every day.      Armodafinil 250 MG Tab Take 1 Tablet by mouth every day.      RYBELSUS 7 MG Tab Take 7 mg by mouth every day.      apixaban (ELIQUIS) 5mg Tab Take 1 Tablet by mouth 2 times a day. 180 Tablet 3    fenofibrate (TRICOR) 145 MG Tab fenofibrate nanocrystallized 145 mg tablet   take 1 tablet by mouth once daily      omeprazole (PRILOSEC) 40 MG delayed-release capsule Take 1 Capsule by mouth every morning before breakfast.      Empagliflozin (JARDIANCE) 25 MG Tab Take 25 mg by mouth.      pioglitazone (ACTOS) 30 MG Tab Take 1 Tab by mouth every day.      pregabalin (LYRICA) 150 MG Cap Take 300 mg by mouth 2 times a day.      Continuous Blood Gluc Sensor (FREESTYLE CHICHI 3 SENSOR) Misc as directed EVERY 14 DAYS (Patient not taking: Reported on 1/26/2024)      tamsulosin (FLOMAX) 0.4 MG capsule Take 1 Capsule by mouth 1/2 hour after breakfast. 90 Capsule 3    levothyroxine (SYNTHROID) 175 MCG Tab Take 175 mcg by mouth Every morning on an empty stomach.       No current facility-administered medications on file prior to visit.       Allergies: Codeine    ROS:   Review of Systems   Constitutional:  Negative for chills, diaphoresis, fever, malaise/fatigue and weight loss.   HENT:  Negative for congestion, ear discharge, ear pain, hearing loss, nosebleeds, sinus pain, sore throat and tinnitus.    Eyes:  Negative for blurred vision, double vision, photophobia, pain, discharge and redness.   Respiratory:  Positive for shortness of breath. Negative for cough, hemoptysis, sputum production, wheezing and stridor.    Cardiovascular:  Negative for chest pain, palpitations, orthopnea, claudication, leg swelling and PND.   Gastrointestinal:  Negative for abdominal pain, constipation, diarrhea, heartburn, nausea and vomiting.   Genitourinary:  Negative for dysuria and urgency.   Musculoskeletal:   "Negative for back pain, falls, joint pain, myalgias and neck pain.   Skin:  Negative for itching and rash.   Neurological:  Negative for dizziness, tremors, speech change, focal weakness, weakness and headaches.   Endo/Heme/Allergies:  Negative for environmental allergies.   Psychiatric/Behavioral:  Negative for depression.        /72 (BP Location: Right arm, Patient Position: Sitting, BP Cuff Size: Adult)   Pulse 70   Ht 1.727 m (5' 8\")   Wt 90.7 kg (200 lb)   SpO2 98%     Physical Exam:  Physical Exam  Vitals reviewed.   Constitutional:       General: He is not in acute distress.     Appearance: Normal appearance.   HENT:      Head: Normocephalic and atraumatic.      Right Ear: External ear normal.      Left Ear: External ear normal.      Nose: Nose normal. No congestion.      Mouth/Throat:      Mouth: Mucous membranes are moist.      Pharynx: Oropharynx is clear. No oropharyngeal exudate.   Eyes:      General: No scleral icterus.     Extraocular Movements: Extraocular movements intact.      Conjunctiva/sclera: Conjunctivae normal.      Pupils: Pupils are equal, round, and reactive to light.   Cardiovascular:      Rate and Rhythm: Normal rate. Rhythm irregular.      Heart sounds: Normal heart sounds. No murmur heard.     No gallop.   Pulmonary:      Effort: No respiratory distress.      Breath sounds: Normal breath sounds. No wheezing or rales.   Abdominal:      General: There is no distension.      Palpations: Abdomen is soft.   Musculoskeletal:         General: Normal range of motion.      Cervical back: Normal range of motion and neck supple.      Right lower leg: No edema.      Left lower leg: No edema.   Skin:     General: Skin is warm and dry.      Findings: No rash.   Neurological:      Mental Status: He is oriented to person, place, and time.      Cranial Nerves: No cranial nerve deficit.   Psychiatric:         Mood and Affect: Mood normal.         Behavior: Behavior normal.         PFTs as " reviewed by me personally: None    Imaging as reviewed by me personally: As per HPI    Assessment:  1. SOB (shortness of breath)  PULMONARY FUNCTION TESTS -Test requested: Complete Pulmonary Function Test      2. Atrial fibrillation, unspecified type (HCC)        3. Asbestos-induced pleural plaque        4. Pulmonary nodules            Plan:  Mild.  Not sure if this is out of proportion to his level of physical fitness but we will obtain full pulmonary function testing.  Exam suggested slow atrial fibrillation today.  On anticoagulation.  Rate controlled.  Father is no routine imaging recommended for pleural plaques, we will follow CT for at least 2 years given pulmonary nodules.  See discussion above.  I am recommending CT in 1 year and surveillance for a period of 2 years to ensure stability.  Return in about 6 months (around 9/14/2024) for PFT any time, then f/u Dr. Taveras in 6 mos.

## 2024-07-18 LAB
CHOLEST SERPL-MCNC: 116 MG/DL
HDLC SERPL-MCNC: 34 MG/DL
LDLC SERPL CALC-MCNC: 63 MG/DL
TRIGL SERPL-MCNC: 87 MG/DL

## 2024-07-22 DIAGNOSIS — I25.10 CORONARY ARTERY DISEASE DUE TO CALCIFIED CORONARY LESION: ICD-10-CM

## 2024-07-22 DIAGNOSIS — I25.10 CORONARY ARTERY CALCIFICATION: ICD-10-CM

## 2024-07-22 DIAGNOSIS — I25.84 CORONARY ARTERY CALCIFICATION: ICD-10-CM

## 2024-07-22 DIAGNOSIS — I25.84 CORONARY ARTERY DISEASE DUE TO CALCIFIED CORONARY LESION: ICD-10-CM

## 2024-08-02 ENCOUNTER — APPOINTMENT (OUTPATIENT)
Dept: CARDIOLOGY | Facility: MEDICAL CENTER | Age: 74
End: 2024-08-02
Attending: INTERNAL MEDICINE
Payer: MEDICARE

## 2024-09-04 DIAGNOSIS — E87.6 HYPOKALEMIA: ICD-10-CM

## 2024-09-04 DIAGNOSIS — Z51.81 INADEQUATE ANTICOAGULATION: ICD-10-CM

## 2024-09-04 DIAGNOSIS — E83.42 HYPOMAGNESEMIA: ICD-10-CM

## 2024-09-04 DIAGNOSIS — Z79.01 INADEQUATE ANTICOAGULATION: ICD-10-CM

## 2024-09-04 DIAGNOSIS — E11.9 TYPE 2 DIABETES MELLITUS WITHOUT COMPLICATION, WITHOUT LONG-TERM CURRENT USE OF INSULIN (HCC): ICD-10-CM

## 2024-09-04 DIAGNOSIS — I50.31 ACUTE DIASTOLIC HEART FAILURE (HCC): ICD-10-CM

## 2024-09-04 RX ORDER — APIXABAN 5 MG/1
5 TABLET, FILM COATED ORAL 2 TIMES DAILY
Qty: 180 TABLET | Refills: 1 | Status: SHIPPED | OUTPATIENT
Start: 2024-09-04 | End: 2024-09-09 | Stop reason: SDUPTHER

## 2024-09-09 DIAGNOSIS — Z51.81 INADEQUATE ANTICOAGULATION: ICD-10-CM

## 2024-09-09 DIAGNOSIS — I48.0 PAROXYSMAL ATRIAL FIBRILLATION (HCC): ICD-10-CM

## 2024-09-09 DIAGNOSIS — E11.9 TYPE 2 DIABETES MELLITUS WITHOUT COMPLICATION, WITHOUT LONG-TERM CURRENT USE OF INSULIN (HCC): ICD-10-CM

## 2024-09-09 DIAGNOSIS — E87.6 HYPOKALEMIA: ICD-10-CM

## 2024-09-09 DIAGNOSIS — I50.31 ACUTE DIASTOLIC HEART FAILURE (HCC): ICD-10-CM

## 2024-09-09 DIAGNOSIS — Z79.01 INADEQUATE ANTICOAGULATION: ICD-10-CM

## 2024-09-09 DIAGNOSIS — E83.42 HYPOMAGNESEMIA: ICD-10-CM

## 2024-09-23 ENCOUNTER — APPOINTMENT (OUTPATIENT)
Dept: SLEEP MEDICINE | Facility: MEDICAL CENTER | Age: 74
End: 2024-09-23
Attending: INTERNAL MEDICINE
Payer: COMMERCIAL

## 2024-09-30 ENCOUNTER — OFFICE VISIT (OUTPATIENT)
Dept: CARDIOLOGY | Facility: MEDICAL CENTER | Age: 74
End: 2024-09-30
Attending: INTERNAL MEDICINE
Payer: COMMERCIAL

## 2024-09-30 VITALS
WEIGHT: 200 LBS | RESPIRATION RATE: 16 BRPM | BODY MASS INDEX: 30.31 KG/M2 | HEIGHT: 68 IN | SYSTOLIC BLOOD PRESSURE: 122 MMHG | HEART RATE: 81 BPM | OXYGEN SATURATION: 99 % | DIASTOLIC BLOOD PRESSURE: 66 MMHG

## 2024-09-30 DIAGNOSIS — I48.0 PAROXYSMAL ATRIAL FIBRILLATION (HCC): ICD-10-CM

## 2024-09-30 DIAGNOSIS — E11.9 TYPE 2 DIABETES MELLITUS WITHOUT COMPLICATION, WITHOUT LONG-TERM CURRENT USE OF INSULIN (HCC): ICD-10-CM

## 2024-09-30 DIAGNOSIS — I10 ESSENTIAL HYPERTENSION: Chronic | ICD-10-CM

## 2024-09-30 DIAGNOSIS — D68.69 HYPERCOAGULABLE STATE DUE TO PAROXYSMAL ATRIAL FIBRILLATION (HCC): ICD-10-CM

## 2024-09-30 DIAGNOSIS — I25.10 CORONARY ARTERY DISEASE DUE TO CALCIFIED CORONARY LESION: ICD-10-CM

## 2024-09-30 DIAGNOSIS — E78.2 MIXED HYPERLIPIDEMIA: ICD-10-CM

## 2024-09-30 DIAGNOSIS — I25.10 CORONARY ARTERY CALCIFICATION: ICD-10-CM

## 2024-09-30 DIAGNOSIS — I25.84 CORONARY ARTERY CALCIFICATION: ICD-10-CM

## 2024-09-30 DIAGNOSIS — I48.0 HYPERCOAGULABLE STATE DUE TO PAROXYSMAL ATRIAL FIBRILLATION (HCC): ICD-10-CM

## 2024-09-30 DIAGNOSIS — I25.84 CORONARY ARTERY DISEASE DUE TO CALCIFIED CORONARY LESION: ICD-10-CM

## 2024-09-30 LAB — EKG IMPRESSION: NORMAL

## 2024-09-30 PROCEDURE — 93005 ELECTROCARDIOGRAM TRACING: CPT | Performed by: INTERNAL MEDICINE

## 2024-09-30 PROCEDURE — 99213 OFFICE O/P EST LOW 20 MIN: CPT | Performed by: INTERNAL MEDICINE

## 2024-09-30 RX ORDER — LOSARTAN POTASSIUM 50 MG/1
50 TABLET ORAL DAILY
Qty: 90 TABLET | Refills: 3 | Status: SHIPPED | OUTPATIENT
Start: 2024-09-30

## 2024-09-30 RX ORDER — METOPROLOL SUCCINATE 25 MG/1
25 TABLET, EXTENDED RELEASE ORAL DAILY
Qty: 90 TABLET | Refills: 3 | Status: SHIPPED | OUTPATIENT
Start: 2024-09-30

## 2024-09-30 RX ORDER — ATORVASTATIN CALCIUM 80 MG/1
80 TABLET, FILM COATED ORAL EVERY EVENING
Qty: 90 TABLET | Refills: 3 | Status: SHIPPED | OUTPATIENT
Start: 2024-09-30

## 2024-09-30 NOTE — PROGRESS NOTES
Cardiology Follow Up Consultation Note    Date of note:    9/30/2024  Primary Care Provider: Jones Au M.D.  Referring Provider: No ref. provider found    Patient Name: Mark Lopez   YOB: 1950  MRN:              [id number]    Chief Complaint   Patient presents with    Follow-Up     F/v Dx: Coronary artery calcification      Hypertension    Atrial Fibrillation       Paroxysmal atrial fibrillation (HCC)       History of Present Illness: Mr. Mark Lopez is a 74-year-old man with past medical history significant for paroxysmal atrial fibrillation, hypertension, hyperlipidemia, CAD/coronary calcification, type 2 diabetes mellitus, CARMELITA, CKD who presents to the cardiology office for follow-up.    Patient last seen in the office 1/26/2024.  During that visit, he was found to have coronary calcification/CAD on CT imaging.  And therapy was increased to atorvastatin 80 mg daily for added lipid-lowering therapy.  He had repeat lipid panel which performed excellent LDL control with LDL less than 70.    Interval history:  He is accompanied by his wife.  He has no major cardiac complaints.  He denies having chest pain, dyspnea or palpitations.  No associated lightheadedness versus dizziness or syncope.  No major bleeding issues while on Eliquis.    EKG performed in office today shows atrial fibrillation with nonspecific ST-T wave changes, ventricular rate 80 bpm.      Cardiovascular Risk Factors:  1. Smoking status: Denies  2. Type II Diabetes Mellitus: Yes   Lab Results   Component Value Date/Time    HBA1C 8.2 (H) 12/23/2015 03:12 PM    HBA1C 8.9 (H) 08/30/2015 06:04 AM     3. Hypertension: Yes  4. Dyslipidemia: Yes   Cholesterol,Tot   Date Value Ref Range Status   11/02/2023 169  Final     LDL   Date Value Ref Range Status   11/02/2023 105  Final     HDL   Date Value Ref Range Status   11/02/2023 35  Final     Triglycerides   Date Value Ref Range Status   11/02/2023 164  Final     5. Family  history of early Coronary Artery Disease in a first degree relative (Male less than 55 years of age; Female less than 65 years of age): Denies      Review of Systems:  As per HPI.  Review of systems assessed and are negative except as stated above.      Past Medical History:   Diagnosis Date    Arthritis     all over    Breath shortness     r/t chf    Cancer ([facility]) 2010    thyroid cancer    Cataract     shandra eyes, no surgery yet    CHEST PAIN 4/23/2012    CHF (congestive heart failure) ([facility]) 8//12/16    diastolic    Dental disorder     upper denture    Disorder of thyroid     had surgery, on meds    DM (diabetes mellitus) ([facility]) 4/23/2012    oral meds only    Heart burn     Heart failure ([facility])     High cholesterol     Hyperlipidemia 4/23/2012    Hypertension     Indigestion     Pain     compression fracture of L1    Renal disorder     kidney stones    Renal failure 2015    due to hemorrhage    Sleep apnea     Snoring          Past Surgical History:   Procedure Laterality Date    CATARACT PHACO WITH IOL Left 08/15/2017    Procedure: CATARACT PHACO WITH IOL;  Surgeon: Osmel Villarreal M.D.;  Location: SURGERY SAME DAY [facility];  Service:     TURP-VAPOR  2016    INCISION AND DRAINAGE GENERAL  09/12/2015    Procedure: INCISION AND DRAINAGE GENERAL ABDOMINAL WASHOUT AND CLOSURE;  Surgeon: Mark Peters M.D.;  Location: SURGERY [facility];  Service:     EXPLORATORY LAPAROTOMY  09/07/2015    Procedure: EXPLORATORY LAPAROTOMY;  Surgeon: Mark Peters M.D.;  Location: SURGERY [facility];  Service:     EXPLORATORY LAPAROTOMY N/A 09/05/2015    Procedure: EXPLORATORY LAPAROTOMY;  Surgeon: Mark Peters M.D.;  Location: SURGERY [facility];  Service:     PARATHYROIDECTOMY  2004    OTHER  01/01/1974    vein stripping rt leg-- groin to ankle    BUNIONECTOMY Left     OTHER      Thyroid    OTHER      Ear tumor    OTHER ABDOMINAL SURGERY      gallbladder    OTHER ORTHOPEDIC SURGERY      right  shoulder x 2         Current Outpatient Medications   Medication Sig Dispense Refill    metoprolol SR (TOPROL XL) 25 MG TABLET SR 24 HR Take 1 Tablet by mouth every day. 90 Tablet 3    apixaban (ELIQUIS) 5mg Tab Take 1 Tablet by mouth 2 times a day. 180 Tablet 3    atorvastatin (LIPITOR) 80 MG tablet Take 1 Tablet by mouth every evening. 90 Tablet 3    losartan (COZAAR) 50 MG Tab Take 1 Tablet by mouth every day. 90 Tablet 3    glimepiride (AMARYL) 2 MG Tab Take 2 mg by mouth every day.      Armodafinil 250 MG Tab Take 1 Tablet by mouth every day.      Continuous Blood Gluc Sensor (FREESTYLE CHICHI 3 SENSOR) Misc       RYBELSUS 7 MG Tab Take 7 mg by mouth every day.      fenofibrate (TRICOR) 145 MG Tab fenofibrate nanocrystallized 145 mg tablet   take 1 tablet by mouth once daily      omeprazole (PRILOSEC) 40 MG delayed-release capsule Take 1 Capsule by mouth every morning before breakfast.      Empagliflozin (JARDIANCE) 25 MG Tab Take 25 mg by mouth.      tamsulosin (FLOMAX) 0.4 MG capsule Take 1 Capsule by mouth 1/2 hour after breakfast. 90 Capsule 3    pioglitazone (ACTOS) 30 MG Tab Take 1 Tab by mouth every day.      pregabalin (LYRICA) 150 MG Cap Take 300 mg by mouth 2 times a day.      levothyroxine (SYNTHROID) 175 MCG Tab Take 175 mcg by mouth Every morning on an empty stomach.       No current facility-administered medications for this visit.         Allergies   Allergen Reactions    Codeine Vomiting and Nausea         Family History   Problem Relation Age of Onset    Heart Attack Father     Heart Disease Father     Heart Disease Brother     Heart Attack Brother          Social History     Socioeconomic History    Marital status:      Spouse name: Not on file    Number of children: Not on file    Years of education: Not on file    Highest education level: Not on file   Occupational History    Not on file   Tobacco Use    Smoking status: Never    Smokeless tobacco: Never   Vaping Use    Vaping status:  "Never Used   Substance and Sexual Activity    Alcohol use: No    Drug use: Not Currently     Types: Marijuana     Comment: medical marijuana    Sexual activity: Not on file     Comment: Medical    Other Topics Concern    Not on file   Social History Narrative    Not on file     Social Determinants of Health     Financial Resource Strain: Not on file   Food Insecurity: Not on file   Transportation Needs: Not on file   Physical Activity: Not on file   Stress: Not on file   Social Connections: Not on file   Intimate Partner Violence: Not on file   Housing Stability: Not on file         Physical Exam:  Ambulatory Vitals  /66 (BP Location: Left arm, Patient Position: Sitting, BP Cuff Size: Adult)   Pulse 81   Resp 16   Ht 1.727 m (5' 8\")   Wt 90.7 kg (200 lb)   SpO2 99%    Oxygen Therapy:  Pulse Oximetry: 99 %  BP Readings from Last 4 Encounters:   09/30/24 122/66   03/14/24 124/72   01/26/24 118/64   11/10/23 122/78       Weight/BMI: Body mass index is 30.41 kg/m².  Wt Readings from Last 4 Encounters:   09/30/24 90.7 kg (200 lb)   03/14/24 90.7 kg (200 lb)   01/26/24 93.4 kg (206 lb)   11/10/23 93 kg (205 lb)         General: Not in acute distress, appears comfortable  Neck:  No carotid bruits, No JVD appreciated  CVS: Irregularly irregular, Normal S1, S2. No murmurs, rubs or gallops  Resp: Normal respiratory effort, lungs CTA bilaterally  Abdomen: Soft, non-distended, non-tender to palpation  Skin: No obvious rashes, no cyanosis  Neurological: Alert and oriented x3, moves all extremities, no focal neurologic deficits  Psychiatric: Appropriate affect  Extremities:   Extremities warm, pulses intact, no edema      Lab Data Review:  Lab Results   Component Value Date/Time    CHOLSTRLTOT 116 07/18/2024 12:00 AM    LDL 63 07/18/2024 12:00 AM    HDL 34 07/18/2024 12:00 AM    TRIGLYCERIDE 87 07/18/2024 12:00 AM       Lab Results   Component Value Date/Time    SODIUM 139 04/01/2022 10:34 AM    POTASSIUM 5.3 " 04/01/2022 10:34 AM    CHLORIDE 104 04/01/2022 10:34 AM    CO2 21 04/01/2022 10:34 AM    GLUCOSE 147 (H) 04/01/2022 10:34 AM    BUN 30 (H) 04/01/2022 10:34 AM    CREATININE 2.06 (H) 04/01/2022 10:34 AM    CREATININE 0.9 02/09/2005 10:25 AM     Lab Results   Component Value Date/Time    ALKPHOSPHAT 87 04/01/2022 10:34 AM    ASTSGOT 19 04/01/2022 10:34 AM    ALTSGPT 16 04/01/2022 10:34 AM    TBILIRUBIN 0.4 04/01/2022 10:34 AM      Lab Results   Component Value Date/Time    WBC 6.4 04/01/2022 10:34 AM    HEMOGLOBIN 16.3 04/01/2022 10:34 AM     Lab Results   Component Value Date/Time    HBA1C 8.2 (H) 12/23/2015 03:12 PM    HBA1C 8.9 (H) 08/30/2015 06:04 AM         Cardiac Imaging and Procedures Review:    EKG dated 2/16/2022:   My personal interpretation is atrial fibrillation  EKG dated 2017:   My personal interpretation is sinus bradycardia    EKG dated 9/30/2024:  My personal interpretation is atrial fibrillation with controlled ventricular rate, nonspecific ST-T wave changes.    Echo dated 3/18/2022:   Mild concentric LVH  Normal LV systolic function  Left ventricular ejection fraction 60%  Normal right ventricular size and function  Mildly dilated left atrium  Trace mitral regurgitation  Trace tricuspid regurgitation  Normal aortic root and ascending aorta size.    Last Lipid panel 2023:   , Trig > 150.    Assessment & Plan     1. Paroxysmal atrial fibrillation (HCC)  EKG    metoprolol SR (TOPROL XL) 25 MG TABLET SR 24 HR    apixaban (ELIQUIS) 5mg Tab      2. Hypercoagulable state due to paroxysmal atrial fibrillation (HCC)        3. Coronary artery disease due to calcified coronary lesion        4. Coronary artery calcification        5. Mixed hyperlipidemia        6. Essential hypertension  losartan (COZAAR) 50 MG Tab      7. Type 2 diabetes mellitus without complication, without long-term current use of insulin (HCC)  atorvastatin (LIPITOR) 80 MG tablet             Medical Decision Making:  Mr. Flores  John is a 74-year-old man with past medical history significant for paroxysmal atrial fibrillation, hypertension, hyperlipidemia, CAD/coronary calcification, type 2 diabetes mellitus, CARMELITA, CKD who presents to the cardiology office for follow-up.    1. Paroxysmal atrial fibrillation (HCC)  2. Hypercoagulable state due to paroxysmal atrial fibrillation (HCC)  -In office EKG repeated which shows atrial fibrillation but currently rate controlled.  -Extensive discussion with the patient regarding management for A-fib including rate versus rhythm control strategy.  Patient is currently asymptomatic and I would like to continue current therapies.  As such we will continue rate control strategy.  Ventricular rates are acceptable.  Will start low-dose metoprolol succinate 25 mg daily to prevent RVR.  -New Eliquis 5 mg twice daily    3.  Coronary artery calcification  4.  Coronary artery disease due to calcified coronary lesion  -Known history of coronary calcification.  No symptoms of angina.  Continue atorvastatin 80 mg daily.    5. Essential hypertension  -Excellent blood pressure control.  Continue losartan.  Start metoprolol as above.    6. Mixed hyperlipidemia  -Much improved lipid panel with higher dose of atorvastatin therapy.  Continue atorvastatin 80 mg daily and fenofibrate.  Known history of mixed hyperlipidemia and concomitant coronary calcification.    () Today's E/M visit is associated with medical care services that serve as the continuing focal point for all needed health care services and/or with medical care services that  are part of ongoing care related to a patient's single, serious condition, or a complex condition: This includes  furnishing services to patients on an ongoing basis that result in care that is personalized  to the patient. The services result in a comprehensive, longitudinal, and continuous  relationship with the patient and involve delivery of team-based care that is accessible,  coordinated with other practitioners and providers, and integrated with the broader health care landscape.       It was a pleasure seeing Mr. Mark Lopez in the office today. Return in about 6 months (around 3/30/2025) for Atrial fibrillation, Coronary artery disease. Patient is aware to call the cardiology clinic with any questions or concerns.      Izaiah Payton MD, [facility]  Cardiologist, [facility], [facility].  [address] [address]  Phone: [phone]  Fax: [phone]    Please note that this dictation was created using voice recognition software. I have made every reasonable attempt to correct obvious errors, but it is possible there are errors of grammar and possibly content that I did not discover before finalizing the note.

## 2024-10-01 LAB — EKG IMPRESSION: NORMAL

## 2024-11-08 ENCOUNTER — TELEPHONE (OUTPATIENT)
Dept: NEPHROLOGY | Facility: MEDICAL CENTER | Age: 74
End: 2024-11-08
Payer: COMMERCIAL

## 2024-11-08 NOTE — TELEPHONE ENCOUNTER
Pt changed his appt to 24. His labs  11/10/24. Please order new ones for this appt.     Note to self: He will like them mailed to him after they are ordered. Address verified, there is a dash between the numbers.

## 2024-11-12 ENCOUNTER — TELEPHONE (OUTPATIENT)
Dept: MEDICAL GROUP | Facility: MEDICAL CENTER | Age: 74
End: 2024-11-12
Payer: COMMERCIAL

## 2024-11-12 DIAGNOSIS — N18.31 STAGE 3A CHRONIC KIDNEY DISEASE: ICD-10-CM

## 2024-11-12 DIAGNOSIS — E11.00 TYPE 2 DIABETES MELLITUS WITH HYPEROSMOLARITY WITHOUT COMA, UNSPECIFIED WHETHER LONG TERM INSULIN USE (HCC): Chronic | ICD-10-CM

## 2024-11-12 DIAGNOSIS — Z87.440 HISTORY OF UTI: ICD-10-CM

## 2024-11-12 DIAGNOSIS — E55.9 VITAMIN D DEFICIENCY: ICD-10-CM

## 2024-11-13 NOTE — TELEPHONE ENCOUNTER
Hi, my office received a prior auth request for Rybelsus, it looks like you may have been the last person to prescribe it. Please see pt media for Prior Auth Request Rybelsus.    Thank you!

## 2024-11-15 NOTE — TELEPHONE ENCOUNTER
JOSE Phelan.  You22 hours ago (4:03 PM)     Please refer to pharmacotherapy clinic for T2DM.  Since I have not seen this patient in over a year, I am not comfortable completing PA at this time.  Pharmacy can prescribe per their DM protocol, otherwise he will need follow-up with his PCP     Phone number called: [phone] (home)      Call outcome: I spoke to pt. He declined the pharmacotherapy referral and would like to have his PCP to take over the medication. I called Dr. Jones Au's office and let them know. I spoke to one of the RN's and she stated that their office has been prescribing Rybelsus. Also pt is taking Rybelsus 14mg not 7mg. Med list updated. Dr. Zurita's office is requesting for the PA form sent to their office. PA faxed. See Media for correspondence. Thank you.     [phone]  Fax:[phone]

## 2024-12-31 ENCOUNTER — APPOINTMENT (OUTPATIENT)
Dept: NEPHROLOGY | Facility: MEDICAL CENTER | Age: 74
End: 2024-12-31
Payer: COMMERCIAL

## 2025-01-02 ENCOUNTER — APPOINTMENT (OUTPATIENT)
Dept: NEPHROLOGY | Facility: MEDICAL CENTER | Age: 75
End: 2025-01-02
Payer: COMMERCIAL

## 2025-01-02 VITALS
TEMPERATURE: 97.5 F | BODY MASS INDEX: 32.49 KG/M2 | SYSTOLIC BLOOD PRESSURE: 124 MMHG | DIASTOLIC BLOOD PRESSURE: 72 MMHG | WEIGHT: 207 LBS | RESPIRATION RATE: 18 BRPM | OXYGEN SATURATION: 97 % | HEIGHT: 67 IN | HEART RATE: 75 BPM

## 2025-01-02 DIAGNOSIS — N18.31 STAGE 3A CHRONIC KIDNEY DISEASE: ICD-10-CM

## 2025-01-02 DIAGNOSIS — I48.0 PAROXYSMAL ATRIAL FIBRILLATION (HCC): ICD-10-CM

## 2025-01-02 DIAGNOSIS — I10 ESSENTIAL HYPERTENSION: Chronic | ICD-10-CM

## 2025-01-02 DIAGNOSIS — Z79.4 TYPE 2 DIABETES MELLITUS WITH OTHER SPECIFIED COMPLICATION, WITH LONG-TERM CURRENT USE OF INSULIN (HCC): Chronic | ICD-10-CM

## 2025-01-02 DIAGNOSIS — N40.0 BENIGN PROSTATIC HYPERPLASIA, UNSPECIFIED WHETHER LOWER URINARY TRACT SYMPTOMS PRESENT: ICD-10-CM

## 2025-01-02 DIAGNOSIS — E11.69 TYPE 2 DIABETES MELLITUS WITH OTHER SPECIFIED COMPLICATION, WITH LONG-TERM CURRENT USE OF INSULIN (HCC): Chronic | ICD-10-CM

## 2025-01-02 DIAGNOSIS — E55.9 VITAMIN D DEFICIENCY: ICD-10-CM

## 2025-01-02 PROCEDURE — 3074F SYST BP LT 130 MM HG: CPT | Performed by: INTERNAL MEDICINE

## 2025-01-02 PROCEDURE — 99214 OFFICE O/P EST MOD 30 MIN: CPT | Performed by: INTERNAL MEDICINE

## 2025-01-02 PROCEDURE — 3078F DIAST BP <80 MM HG: CPT | Performed by: INTERNAL MEDICINE

## 2025-01-02 RX ORDER — ORAL SEMAGLUTIDE 14 MG/1
14 TABLET ORAL DAILY
COMMUNITY

## 2025-01-02 ASSESSMENT — ENCOUNTER SYMPTOMS
FEVER: 0
ABDOMINAL PAIN: 0
SHORTNESS OF BREATH: 1
ORTHOPNEA: 0

## 2025-01-02 NOTE — PROGRESS NOTES
Chief Complaint   Patient presents with    Chronic Kidney Disease     CC: f/u CKD    HPI:  Mark Lopez is a 74 y.o. male with a history of MICHAEL episode in 2015 requiring 2 weeks of dialysis, diabetes, hypertension, BPH, CKD 3 who presents today for follow-up.     He has a CPAP and uses it every day. He still has fatigue. He does use armodafanil.     Re: DM2, diagnosed 2004. Patient hasn't had albuminuria checked. Patient has seen an eye doctor and does not have retinopathy. He wears a CGM. He says his A1c is 7%. He's on Jardiance, rybelsus, glimepiride, and pioglitazone. Remains off of insulin.     Re: HTN, diagnosed with diastolic CHF in 2016. He thinks he was on BP meds in the 2000's. In 2015, he had low blood pressure readings, and was taken off BP meds and just put on diuretics. He occasionally checks BP at home, usually 120's / 70's. He's on losartan instead of lisinopril due to dry cough.     Re: CKD, he had MICHAEL episode in 2015 when he had hemoperitoneum and ATN as a complication from cholecystectomy. He required dialysis for 2-3 weeks. He's had two kidney stones in his life, no recent kidney stones, he thinks he drinks maybe 32 oz of water per day. He denies NSAIDs. Denies bladder issues on tamsulosin, has stable 1x nocturia. He did have TURP back in 2016 or so.       Past Medical History:   Diagnosis Date    Arthritis     all over    Breath shortness     r/t chf    Cancer (Formerly [facility]) 2010    thyroid cancer    Cataract     shandra eyes, no surgery yet    CHEST PAIN 4/23/2012    CHF (congestive heart failure) (Formerly [facility]) 8//12/16    diastolic    Dental disorder     upper denture    Disorder of thyroid     had surgery, on meds    DM (diabetes mellitus) (Formerly [facility]) 4/23/2012    oral meds only    Heart burn     Heart failure (Formerly [facility])     High cholesterol     Hyperlipidemia 4/23/2012    Hypertension     Indigestion     Pain     compression fracture of L1    Renal disorder     kidney stones    Renal failure 2015    due to hemorrhage     Sleep apnea     Snoring        Past Surgical History:   Procedure Laterality Date    CATARACT PHACO WITH IOL Left 08/15/2017    Procedure: CATARACT PHACO WITH IOL;  Surgeon: Osmel Villarreal M.D.;  Location: SURGERY SAME DAY [facility];  Service:     TURP-VAPOR  2016    INCISION AND DRAINAGE GENERAL  09/12/2015    Procedure: INCISION AND DRAINAGE GENERAL ABDOMINAL WASHOUT AND CLOSURE;  Surgeon: Mark Peters M.D.;  Location: SURGERY [facility];  Service:     EXPLORATORY LAPAROTOMY  09/07/2015    Procedure: EXPLORATORY LAPAROTOMY;  Surgeon: Mark Peters M.D.;  Location: SURGERY [facility];  Service:     EXPLORATORY LAPAROTOMY N/A 09/05/2015    Procedure: EXPLORATORY LAPAROTOMY;  Surgeon: Mark Peters M.D.;  Location: SURGERY [facility];  Service:     PARATHYROIDECTOMY  2004    OTHER  01/01/1974    vein stripping rt leg-- groin to ankle    BUNIONECTOMY Left     OTHER      Thyroid    OTHER      Ear tumor    OTHER ABDOMINAL SURGERY      gallbladder    OTHER ORTHOPEDIC SURGERY      right shoulder x 2        Outpatient Encounter Medications as of 1/2/2025   Medication Sig Dispense Refill    metoprolol SR (TOPROL XL) 25 MG TABLET SR 24 HR Take 1 Tablet by mouth every day. 90 Tablet 3    apixaban (ELIQUIS) 5mg Tab Take 1 Tablet by mouth 2 times a day. 180 Tablet 3    atorvastatin (LIPITOR) 80 MG tablet Take 1 Tablet by mouth every evening. 90 Tablet 3    losartan (COZAAR) 50 MG Tab Take 1 Tablet by mouth every day. 90 Tablet 3    glimepiride (AMARYL) 2 MG Tab Take 2 mg by mouth every day.      Armodafinil 250 MG Tab Take 1 Tablet by mouth every day.      Continuous Blood Gluc Sensor (FREESTYLE CHICHI 3 SENSOR) Misc       RYBELSUS 7 MG Tab Take 7 mg by mouth every day.      fenofibrate (TRICOR) 145 MG Tab fenofibrate nanocrystallized 145 mg tablet   take 1 tablet by mouth once daily      omeprazole (PRILOSEC) 40 MG delayed-release capsule Take 1 Capsule by mouth every morning before  "breakfast.      Empagliflozin (JARDIANCE) 25 MG Tab Take 25 mg by mouth.      tamsulosin (FLOMAX) 0.4 MG capsule Take 1 Capsule by mouth 1/2 hour after breakfast. 90 Capsule 3    pioglitazone (ACTOS) 30 MG Tab Take 1 Tab by mouth every day.      pregabalin (LYRICA) 150 MG Cap Take 300 mg by mouth 2 times a day.      levothyroxine (SYNTHROID) 175 MCG Tab Take 175 mcg by mouth Every morning on an empty stomach.       No facility-administered encounter medications on file as of 1/2/2025.        Allergies   Allergen Reactions    Codeine Vomiting and Nausea             Review of Systems   Constitutional:  Negative for fever.   Respiratory:  Positive for shortness of breath (with exertion).    Cardiovascular:  Negative for chest pain and orthopnea.   Gastrointestinal:  Negative for abdominal pain.   Genitourinary:  Negative for dysuria and hematuria.   All other systems reviewed and are negative.      /72 (BP Location: Left arm, Patient Position: Sitting, BP Cuff Size: Adult)   Pulse 75   Temp 36.4 °C (97.5 °F) (Temporal)   Resp 18   Ht 1.702 m (5' 7\")   Wt 93.9 kg (207 lb)   SpO2 97%   BMI 32.42 kg/m²     Physical Exam  Constitutional:       General: He is not in acute distress.  HENT:      Mouth/Throat:      Pharynx: No oropharyngeal exudate.   Eyes:      General: No scleral icterus.  Neck:      Trachea: No tracheal deviation.   Cardiovascular:      Rate and Rhythm: Normal rate. Rhythm irregular.      Heart sounds: Normal heart sounds. No murmur heard.  Pulmonary:      Effort: Pulmonary effort is normal.      Breath sounds: Normal breath sounds. No stridor. No rales.   Abdominal:      General: Bowel sounds are normal.      Palpations: Abdomen is soft.      Tenderness: There is no abdominal tenderness.   Musculoskeletal:         General: Normal range of motion.      Cervical back: Neck supple.      Right lower leg: No edema.      Left lower leg: No edema.   Skin:     General: Skin is warm and dry.      " Findings: No rash.   Neurological:      General: No focal deficit present.      Mental Status: He is alert and oriented to person, place, and time.   Psychiatric:         Mood and Affect: Mood and affect normal.         Behavior: Behavior normal.         Labs reviewed.    Labs 12/18/2024  WBC 5.3  Hemoglobin 17.5  Platelets 182  Sodium 141  Potassium 4.4  Chloride 108  CO2 26  BUN 25  Creatinine 1.50  Estimated GFR 49  Glucose 107  Calcium 9.7  Phosphorus 3.6  Albumin 4.1  Urinalysis with greater than 1000 glucose, negative ketone, trace protein, negative blood, 1-5 WBC, 1-2 RBC  Urine albumin creatinine ratio 95 mg/g  Vitamin D level 30.1  Intact PTH 66    Labs 10/27/2023  Hemoglobin 17.3  Platelets 200  Sodium 140  Potassium 4.2  Chloride 106  CO2 25  BUN 35  Creatinine 1.5  Estimated GFR 49  Glucose 134  Calcium 9.5  Phosphorus 3.6  Albumin 3.7  Vitamin D level 50  Intact PTH 43  Urinalysis with greater than 1000 glucose, negative ketone, negative protein, negative blood, negative leukocyte Esterase  Urine albumin creatinine ratio 59 mg/g  Urine protein creatinine ratio 215 mg/g    Labs 10/20/2022  Hemoglobin 16.9  Hemoglobin A1c 8.5%  Sodium 140  Potassium 5.5  Chloride 103  CO2 26  BUN 29  Creatinine 1.6  Estimated GFR 46  Glucose 116  Calcium 9.6  Albumin 4.2  Phosphorus 4.3  Intact PTH 47  Vitamin D level 30.1  Urinalysis with 500 glucose, negative ketone, negative protein, negative blood, negative leukocyte esterase  Urine albumin creatinine ratio 20 mg/g  Urine protein creatinine ratio 133 mg/g    Labs 8/17/2022  Sodium 141  Potassium 4.8  Chloride 105  CO2 26  BUN 28  Creatinine 1.6  Estimated GFR 46  Glucose 158  Calcium 9.5  Phosphorus 3.3  Albumin 4.2  Intact PTH 38    Labs 6/1/2022  Hemoglobin 17.2  Platelets 241  Sodium 140  Potassium 4.5  Chloride 105  CO2 25  BUN 36  Creatinine 2.1  Estimated GFR 33  Glucose 170  Calcium 9.9  Phosphorus 4.0  Albumin 4.5  Intact PTH 38  Urine protein not  "quantified      Recent Labs     07/18/24  0000   HDL 34   TRIGLYCERIDE 87       Lab Results   Component Value Date/Time    WBC 6.4 04/01/2022 10:34 AM    RBC 6.38 (H) 04/01/2022 10:34 AM    HEMOGLOBIN 16.3 04/01/2022 10:34 AM    HEMATOCRIT 52.2 (H) 04/01/2022 10:34 AM    MCV 81.8 04/01/2022 10:34 AM    MCH 25.5 (L) 04/01/2022 10:34 AM    MCHC 31.2 (L) 04/01/2022 10:34 AM    MPV 10.8 09/16/2016 10:32 AM             URINALYSIS:  Lab Results   Component Value Date/Time    COLORURINE Dark-Red 09/01/2015 0400    CLARITY Cloudy (A) 09/01/2015 0400    SPECGRAVITY 1.017 09/01/2015 0400    PHURINE 6.0 09/01/2015 0400    KETONES 10 (A) 09/01/2015 0400    PROTEINURIN 100 (A) 09/01/2015 0400    BILIRUBINUR Negative 09/01/2015 0400    NITRITE Negative 09/01/2015 0400    LEUKESTERAS Large (A) 09/01/2015 0400    OCCULTBLOOD Large (A) 09/01/2015 0400       UPC  No results found for: \"TOTPROTUR\"   Lab Results   Component Value Date/Time    CREATININEU 179.10 08/30/2015 1400           Imaging reviewed  No orders to display         Assessment:  Mark Lopez is a 74 y.o. male with a history of MICHAEL episode in 2015 requiring 2 weeks of dialysis, diabetes, hypertension, BPH, CKD 3 who presents today for follow-up.     Plan:  1. Stage 3a chronic kidney disease (HCC)  -Creatinine and GFR are stable within stage IIIa CKD.  Underlying CKD likely from diabetic nephropathy given nearly 20 years of poorly controlled diabetes, as well as MICHAEL episode in 2015 from ATN from shock and hemoperitoneum requiring about 2 weeks of dialysis.  I recommend avoiding NSAIDs and other nephrotoxins.  I recommend a whole food, plant-based, low-salt diet.  I explained the importance of glycemic and blood pressure control to help slow CKD progression.  I recommend maintaining losartan and Jardiance for long-term kidney protection.  Patient remains at mild risk of CKD progression with his microalbuminuria.     2. Type 2 diabetes mellitus with other specified " complication, with long-term current use of insulin (HCC)  -Better controlled with CGM in place.  I recommend maintaining Jardiance and losartan for long-term kidney protection.  If diabetes improves with plant-based diet, I would recommend reducing or eliminating glimepiride prior to eliminating Rybelsus or Jardiance.    3. Essential hypertension  -Well-controlled.  Maintain losartan 50 mg daily, metoprolol 25 mg daily.    4. Benign prostatic hyperplasia, unspecified whether lower urinary tract symptoms present  -Patient has a history of TURP, but still has occasional symptoms of BPH, so I recommend that he continue tamsulosin 0.4 mg p.o. daily.    5. Chronic diastolic heart failure (HCC)  -Patient appears euvolemic on exam.  Maintain losartan.  No acute need for loop diuretic therapy at this time.    6.  History of presumed hypoparathyroidism  -It is unclear to me why patient was previously prescribed vitamin D receptor agonist in the past.  I stopped his paricalcitol and doxercalciferol in summer 2022.  His PTH remains within normal range.  Vitamin D is on the low end of normal.  I recommend maintenance over-the-counter vitamin D 2000 units daily or 5000 units 3 times weekly.      Return to clinic in 12 months with pre-clinic labs    Fabian Greene MD  Nephrology  [facility]

## 2025-06-19 DIAGNOSIS — I48.0 PAROXYSMAL ATRIAL FIBRILLATION (HCC): ICD-10-CM

## 2025-06-19 RX ORDER — APIXABAN 5 MG/1
5 TABLET, FILM COATED ORAL 2 TIMES DAILY
Qty: 180 TABLET | Refills: 0 | Status: SHIPPED | OUTPATIENT
Start: 2025-06-19

## 2025-06-19 NOTE — TELEPHONE ENCOUNTER
Last OV on 9/30/24 with MK. Last CBC and creatinine from outside labs on 12/18/24. Creatinine elevated at 1.5 but stable from previous lab work in Nov 2023 and improved from Cr of 2 noted in last OV note from 2022.    Schedulers: Please contact pt to schedule with MK for annual OV appt. Thank you!

## 2025-08-11 ENCOUNTER — OFFICE VISIT (OUTPATIENT)
Facility: MEDICAL CENTER | Age: 75
End: 2025-08-11
Attending: INTERNAL MEDICINE
Payer: COMMERCIAL

## 2025-08-11 VITALS
HEART RATE: 63 BPM | DIASTOLIC BLOOD PRESSURE: 70 MMHG | BODY MASS INDEX: 32.33 KG/M2 | HEIGHT: 67 IN | WEIGHT: 206 LBS | RESPIRATION RATE: 16 BRPM | SYSTOLIC BLOOD PRESSURE: 116 MMHG | OXYGEN SATURATION: 98 %

## 2025-08-11 DIAGNOSIS — D68.69 HYPERCOAGULABLE STATE DUE TO PERSISTENT ATRIAL FIBRILLATION (HCC): Primary | ICD-10-CM

## 2025-08-11 DIAGNOSIS — I25.10 CORONARY ARTERY DISEASE DUE TO CALCIFIED CORONARY LESION: ICD-10-CM

## 2025-08-11 DIAGNOSIS — I48.11 LONGSTANDING PERSISTENT ATRIAL FIBRILLATION (HCC): ICD-10-CM

## 2025-08-11 DIAGNOSIS — E11.9 TYPE 2 DIABETES MELLITUS WITHOUT COMPLICATION, WITHOUT LONG-TERM CURRENT USE OF INSULIN (HCC): ICD-10-CM

## 2025-08-11 DIAGNOSIS — E78.2 MIXED HYPERLIPIDEMIA: ICD-10-CM

## 2025-08-11 DIAGNOSIS — I25.84 CORONARY ARTERY DISEASE DUE TO CALCIFIED CORONARY LESION: ICD-10-CM

## 2025-08-11 DIAGNOSIS — I48.19 HYPERCOAGULABLE STATE DUE TO PERSISTENT ATRIAL FIBRILLATION (HCC): Primary | ICD-10-CM

## 2025-08-11 DIAGNOSIS — I10 ESSENTIAL HYPERTENSION: ICD-10-CM

## 2025-08-11 PROCEDURE — 3078F DIAST BP <80 MM HG: CPT | Performed by: INTERNAL MEDICINE

## 2025-08-11 PROCEDURE — 99214 OFFICE O/P EST MOD 30 MIN: CPT | Performed by: INTERNAL MEDICINE

## 2025-08-11 PROCEDURE — 99212 OFFICE O/P EST SF 10 MIN: CPT | Performed by: INTERNAL MEDICINE

## 2025-08-11 PROCEDURE — 3074F SYST BP LT 130 MM HG: CPT | Performed by: INTERNAL MEDICINE

## 2025-08-11 RX ORDER — ATORVASTATIN CALCIUM 80 MG/1
80 TABLET, FILM COATED ORAL EVERY EVENING
Qty: 100 TABLET | Refills: 3 | Status: SHIPPED | OUTPATIENT
Start: 2025-08-11

## 2025-08-11 RX ORDER — LOSARTAN POTASSIUM 100 MG/1
100 TABLET ORAL DAILY
Qty: 100 TABLET | Refills: 3 | Status: SHIPPED | OUTPATIENT
Start: 2025-08-11

## 2025-08-12 ENCOUNTER — TELEPHONE (OUTPATIENT)
Dept: CARDIOLOGY | Facility: MEDICAL CENTER | Age: 75
End: 2025-08-12
Payer: COMMERCIAL

## 2025-08-26 ENCOUNTER — TELEPHONE (OUTPATIENT)
Dept: CARDIOLOGY | Facility: MEDICAL CENTER | Age: 75
End: 2025-08-26
Payer: COMMERCIAL

## (undated) DEVICE — KIT, EYE POST-OP FOR PHACOS

## (undated) DEVICE — SET LEADWIRE 5 LEAD BEDSIDE DISPOSABLE ECG (1SET OF 5/EA)

## (undated) DEVICE — CON SEDATION/>5 YR 1ST 15 MIN

## (undated) DEVICE — ELECTRODE 850 FOAM ADHESIVE - HYDROGEL RADIOTRNSPRNT (50/PK)

## (undated) DEVICE — GLOVE BIOGEL SURGICAL PF LATEX M SIZE 8.5 (50PR/BX 4BX/CA)

## (undated) DEVICE — KIT  I.V. START (100EA/CA)

## (undated) DEVICE — CANNULA INJECTION 27G (EYE) - 10/BX ALCON

## (undated) DEVICE — SENSOR SPO2 NEO LNCS ADHESIVE (20/BX) SEE USER NOTES

## (undated) DEVICE — NEEDLE CYSTOTOME OPTH VSTC  0.4MM X 16MM - (10/CA)

## (undated) DEVICE — TUBING CLEARLINK DUO-VENT - C-FLO (48EA/CA)

## (undated) DEVICE — EXTRACTOR CORTEX ANGL LT 26GA - (10/BX) BINKHORST

## (undated) DEVICE — NEEDLE FILTER ASPIRATION 18 GA X 1 1/2 IN (100EA/BX)

## (undated) DEVICE — CANNULA DIVIDED ADULT CO2 - SAMPLE W/FEMALE CONNCT (25/CA)

## (undated) DEVICE — KNIFE STEP 1.1 (6EA/BX)

## (undated) DEVICE — ATOMIC EDGE ACCURATE DEPTH 550 MICRON (10/CA)

## (undated) DEVICE — CATHETER IV 20 GA X 1-1/4 ---SURG.& SDS ONLY--- (50EA/BX)

## (undated) DEVICE — SYRINGE SAFETY 3 ML 18 GA X 1 1/2 BLUNT LL (100/BX 8BX/CA)

## (undated) DEVICE — Device

## (undated) DEVICE — GLOVE BIOGEL SZ 7 SURGICAL PF LTX - (50PR/BX 4BX/CA)